# Patient Record
Sex: FEMALE | Race: WHITE | NOT HISPANIC OR LATINO | URBAN - METROPOLITAN AREA
[De-identification: names, ages, dates, MRNs, and addresses within clinical notes are randomized per-mention and may not be internally consistent; named-entity substitution may affect disease eponyms.]

---

## 2019-07-30 DIAGNOSIS — Z30.09 CONTRACEPTIVE EDUCATION: Primary | ICD-10-CM

## 2021-06-07 ENCOUNTER — APPOINTMENT (OUTPATIENT)
Dept: URGENT CARE | Facility: CLINIC | Age: 26
End: 2021-06-07

## 2021-06-07 DIAGNOSIS — Z02.1 PRE-EMPLOYMENT HEALTH SCREENING EXAMINATION: Primary | ICD-10-CM

## 2021-06-07 PROCEDURE — 86480 TB TEST CELL IMMUN MEASURE: CPT

## 2021-06-10 LAB
GAMMA INTERFERON BACKGROUND BLD IA-ACNC: 0.03 IU/ML
M TB IFN-G BLD-IMP: NEGATIVE
M TB IFN-G CD4+ BCKGRND COR BLD-ACNC: 0 IU/ML
M TB IFN-G CD4+ BCKGRND COR BLD-ACNC: 0 IU/ML
MITOGEN IGNF BCKGRD COR BLD-ACNC: 3.52 IU/ML

## 2022-04-15 ENCOUNTER — OFFICE VISIT (OUTPATIENT)
Dept: FAMILY MEDICINE CLINIC | Facility: HOSPITAL | Age: 27
End: 2022-04-15
Payer: COMMERCIAL

## 2022-04-15 VITALS
HEART RATE: 102 BPM | HEIGHT: 64 IN | DIASTOLIC BLOOD PRESSURE: 80 MMHG | BODY MASS INDEX: 22.74 KG/M2 | WEIGHT: 133.2 LBS | OXYGEN SATURATION: 98 % | TEMPERATURE: 98.8 F | SYSTOLIC BLOOD PRESSURE: 125 MMHG

## 2022-04-15 DIAGNOSIS — Z00.00 ANNUAL PHYSICAL EXAM: Primary | ICD-10-CM

## 2022-04-15 DIAGNOSIS — Z12.4 ENCOUNTER FOR SCREENING FOR CERVICAL CANCER: ICD-10-CM

## 2022-04-15 PROCEDURE — 99385 PREV VISIT NEW AGE 18-39: CPT | Performed by: NURSE PRACTITIONER

## 2022-04-15 PROCEDURE — 3008F BODY MASS INDEX DOCD: CPT | Performed by: NURSE PRACTITIONER

## 2022-04-15 PROCEDURE — 3725F SCREEN DEPRESSION PERFORMED: CPT | Performed by: NURSE PRACTITIONER

## 2022-04-15 PROCEDURE — 1036F TOBACCO NON-USER: CPT | Performed by: NURSE PRACTITIONER

## 2022-04-15 RX ORDER — NORETHINDRONE ACETATE AND ETHINYL ESTRADIOL 1MG-20(24)
KIT ORAL
COMMUNITY
Start: 2015-03-01 | End: 2022-07-12

## 2022-04-15 RX ORDER — NORETHINDRONE ACETATE AND ETHINYL ESTRADIOL 1MG-20(24)
1 KIT ORAL DAILY
COMMUNITY
End: 2022-07-12

## 2022-04-15 NOTE — PROGRESS NOTES
[address] [facility] [address]     NAME: Mayra Singh  AGE: 28 y o  SEX: female  : 1995     DATE: 4/15/2022     Assessment and Plan:     Problem List Items Addressed This Visit     None      Visit Diagnoses     Annual physical exam    -  Primary    PE updated, advise next in 1 year; consult entered to establish w/local gyn; will defer labs at this time    Encounter for screening for cervical cancer        Relevant Orders    Ambulatory Referral to Gynecology        Immunizations and preventive care screenings were discussed with patient today  Appropriate education was printed on patient's after visit summary  Counseling:  Alcohol/drug use: discussed moderation in alcohol intake, the recommendations for healthy alcohol use, and avoidance of illicit drug use  Dental Health: discussed importance of regular tooth brushing, flossing, and dental visits  Sexual health: discussed sexually transmitted diseases, partner selection, use of condoms, avoidance of unintended pregnancy, and contraceptive alternatives  · Exercise: the importance of regular exercise/physical activity was discussed  Recommend exercise 3-5 times per week for at least 30 minutes  Depression Screening and Follow-up Plan: Patient was screened for depression during today's encounter  They screened negative with a PHQ-2 score of 0  Return in about 1 year (around 4/15/2023) for Annual physical      Chief Complaint:     Chief Complaint   Patient presents with    New Patient Visit      History of Present Illness:     Adult Annual Physical   Patient here for a comprehensive physical exam  The patient reports no problems   PMH: none  PSH: wisdom teeth  Meds: OCP rx'd by gyn  Social: , no children, 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)      Diet and Physical Activity  · Diet/Nutrition: well balanced diet     · Exercise: strength training exercises and 3-4 times a week on average  Depression Screening  PHQ-2/9 Depression Screening    Little interest or pleasure in doing things: 0 - not at all  Feeling down, depressed, or hopeless: 0 - not at all  PHQ-2 Score: 0  PHQ-2 Interpretation: Negative depression screen       General Health  · Sleep: sleeps well  · Hearing: normal - bilateral   · Vision: goes for regular eye exams, most recent eye exam <1 year ago and wears contacts  · Dental: regular dental visits  /GYN Health  · Last menstrual period: 4/2022  · Contraceptive method: oral contraceptives  Review of Systems:     Review of Systems   Constitutional: Negative  HENT: Negative  Eyes: Negative  Respiratory: Negative  Cardiovascular: Negative  Gastrointestinal: Negative  Genitourinary: Negative  Musculoskeletal: Negative  Skin: Negative  Neurological: Negative         Past Medical History:     Past Medical History:   Diagnosis Date    Allergic       Past Surgical History:     Past Surgical History:   Procedure Laterality Date    WISDOM TOOTH EXTRACTION  2016      Social History:     Social History     Socioeconomic History    Marital status: Unknown     Spouse name: None    Number of children: None    Years of education: None    Highest education level: None   Occupational History    None   Tobacco Use    Smoking status: Never Smoker    Smokeless tobacco: Never Used   Vaping Use    Vaping Use: Never used   Substance and Sexual Activity    Alcohol use: Yes     Comment: social     Drug use: Never    Sexual activity: None   Other Topics Concern    None   Social History Narrative    None     Social Determinants of Health     Financial Resource Strain: Not on file   Food Insecurity: Not on file   Transportation Needs: Not on file   Physical Activity: Not on file   Stress: Not on file   Social Connections: Not on file   Intimate Partner Violence: Not on file   Housing Stability: Not on file      Family History:     Family History   Problem Relation Age of Onset    Hypertension Mother     Heart disease Father    Suzi Punt' disease Father     Arrhythmia Father         pacemaker placed      Current Medications:     Current Outpatient Medications   Medication Sig Dispense Refill    norethindrone-ethinyl estradiol-ferrous fumarate (Blisovi 24 Fe) 1-20 MG-MCG(24) per tablet Take 1 tablet by mouth daily      norethindrone-ethinyl estradiol-ferrous fumarate (Blisovi 24 Fe) 1-20 MG-MCG(24) per tablet        No current facility-administered medications for this visit  Allergies: Allergies   Allergen Reactions    Amoxicillin Hives    Keflex [Cephalexin] Hives      Physical Exam:     /80 (BP Location: Right arm, Patient Position: Sitting, Cuff Size: Large)   Pulse 102   Temp 98 8 °F (37 1 °C) (Tympanic)   Ht 5' 4" (1 626 m)   Wt 60 4 kg (133 lb 3 2 oz)   SpO2 98%   BMI 22 86 kg/m²     Physical Exam  Vitals reviewed  Constitutional:       General: She is not in acute distress  Appearance: Normal appearance  HENT:      Head: Normocephalic and atraumatic  Right Ear: Tympanic membrane normal       Left Ear: Tympanic membrane normal       Nose: Nose normal       Mouth/Throat:      Mouth: Mucous membranes are moist       Pharynx: Oropharynx is clear  Eyes:      General: No scleral icterus  Neck:      Thyroid: No thyromegaly  Cardiovascular:      Rate and Rhythm: Normal rate and regular rhythm  Heart sounds: No murmur heard  Pulmonary:      Effort: Pulmonary effort is normal  No respiratory distress  Breath sounds: Normal breath sounds  Abdominal:      General: Bowel sounds are normal       Palpations: Abdomen is soft  Tenderness: There is no abdominal tenderness  There is no guarding or rebound  Musculoskeletal:      Cervical back: Normal range of motion  Right lower leg: No edema  Left lower leg: No edema  Lymphadenopathy:      Cervical: No cervical adenopathy     Skin: General: Skin is warm and dry  Neurological:      General: No focal deficit present  Mental Status: She is alert and oriented to person, place, and time  Psychiatric:         Mood and Affect: Mood normal          Behavior: Behavior normal          Thought Content:  Thought content normal          Judgment: Judgment normal           Rometta Dance, CRNP   [address] [place]

## 2022-04-15 NOTE — PATIENT INSTRUCTIONS

## 2022-07-12 ENCOUNTER — OFFICE VISIT (OUTPATIENT)
Dept: OBGYN CLINIC | Facility: CLINIC | Age: 27
End: 2022-07-12
Payer: COMMERCIAL

## 2022-07-12 VITALS
WEIGHT: 138.2 LBS | BODY MASS INDEX: 23.6 KG/M2 | HEIGHT: 64 IN | DIASTOLIC BLOOD PRESSURE: 81 MMHG | SYSTOLIC BLOOD PRESSURE: 118 MMHG

## 2022-07-12 DIAGNOSIS — Z30.41 SURVEILLANCE FOR BIRTH CONTROL, ORAL CONTRACEPTIVES: ICD-10-CM

## 2022-07-12 DIAGNOSIS — Z30.09 CONTRACEPTIVE EDUCATION: ICD-10-CM

## 2022-07-12 DIAGNOSIS — Z31.69 ENCOUNTER FOR PRECONCEPTION CONSULTATION: ICD-10-CM

## 2022-07-12 DIAGNOSIS — Z12.4 ENCOUNTER FOR SCREENING FOR CERVICAL CANCER: ICD-10-CM

## 2022-07-12 DIAGNOSIS — Z01.419 ENCOUNTER FOR GYNECOLOGICAL EXAMINATION WITHOUT ABNORMAL FINDING: Primary | ICD-10-CM

## 2022-07-12 PROCEDURE — 99385 PREV VISIT NEW AGE 18-39: CPT | Performed by: OBSTETRICS & GYNECOLOGY

## 2022-07-12 PROCEDURE — 0503F POSTPARTUM CARE VISIT: CPT | Performed by: OBSTETRICS & GYNECOLOGY

## 2022-07-12 NOTE — PATIENT INSTRUCTIONS
Pap every 3 years if normal, STI testing as indicated, exercise most days of week, obtain appropriate diet and hydration, Calcium 1000mg + 600 vit D daily, birth control as directed (ACHES reviewed)  Benefits, risks and alternatives discussed/reviewed  Condom use when sexually active for sexually transmitted infection prevention  HPV 9 vaccine recommended through age 39  Check with your insurance for coverage  If covered, call office to schedule start of vaccine series  Annual mammogram starting at age 36, monthly breast self exam  Regina Heimlich   at red light or at least  20 times a day  If not  pregant in one year of actively trying with normal cycles  contact the office

## 2022-07-12 NOTE — PROGRESS NOTES
[address], [address], [facility] [address]    ASSESSMENT/PLAN: Naren Ortiz is a 28 y o  No obstetric history on file  who presents for annual gynecologic exam     Encounter for routine gynecologic examination  - Routine well woman exam completed today  - HPV Vaccination status: Immunization series complete   -covid  Vaccine  + maderna  X 3  dw p booster and flu shot in fall   - STI screening offered including HIV testing: culture done   - Contraceptive counseling discussed  Current contraception: none or condoms:     Additional problems addressed during this visit:  1  Encounter for gynecological examination without abnormal finding    2  Contraceptive education  Comments:  Stopped the pill now report cycles less than 21d  lasting greater than 10 d or no menses in 3 mo    3  Surveillance for birth control, oral contraceptives    4  Encounter for screening for cervical cancer  -     Ambulatory Referral to Gynecology  -     IGP,CtNg,AptimaHPV,rfx16/18,45    5  Encounter for preconception consultation  Comments:  no genetic disorders,  Dw pt ovulation and  timed  intercourse  Start PNV  no alcohol intake    Orders:  -     Rubella antibody, IgG; Future  -     Rubella antibody, IgG      CC:  Annual Gynecologic Examination    HPI: Naren Ortiz is a 28 y o  No obstetric history on file  who presents for annual gynecologic examination  27 yo here for wellness exam     Cycles  Monthly for  5 days  +  Gardasil  + [address]  X 3  Desires  pregnancy in the next year just stopped the pill    No genetic disorders in either family  No significant medical  Hx  The following portions of the patient's history were reviewed and updated as appropriate: She  has a past medical history of Allergic  She  has a past surgical history that includes [place] tooth extraction (2016)    Her family history includes Arrhythmia in her father; Duaine Dozier' disease in her father; Heart disease in her father; Hypertension in her mother  She  reports that she has never smoked  She has never used smokeless tobacco  She reports current alcohol use of about 1 0 standard drink of alcohol per week  She reports that she does not use drugs  No current outpatient medications on file  No current facility-administered medications for this visit  She is allergic to amoxicillin and keflex [cephalexin]       Review of Systems   Constitutional: Negative for chills and fever  HENT: Negative for ear pain and sore throat  Eyes: Negative for pain and visual disturbance  Respiratory: Negative for cough and shortness of breath  Cardiovascular: Negative for chest pain and palpitations  Gastrointestinal: Negative for abdominal pain and vomiting  Genitourinary: Negative for dysuria and hematuria  Musculoskeletal: Negative for arthralgias and back pain  Skin: Negative for color change and rash  Neurological: Negative for seizures and syncope  Hematological: Negative  Psychiatric/Behavioral: Negative  All other systems reviewed and are negative  Objective:  /81 (BP Location: Left arm, Patient Position: Sitting, Cuff Size: Standard)   Ht 5' 4" (1 626 m)   Wt 62 7 kg (138 lb 3 2 oz)   LMP 07/12/2022 (Exact Date)   BMI 23 72 kg/m²    Physical Exam  Vitals and nursing note reviewed  Constitutional:       Appearance: Normal appearance  HENT:      Head: Normocephalic  Cardiovascular:      Rate and Rhythm: Normal rate and regular rhythm  Pulses: Normal pulses  Heart sounds: Normal heart sounds  Pulmonary:      Effort: Pulmonary effort is normal       Breath sounds: Normal breath sounds  Chest:      Chest wall: No mass, lacerations, swelling, tenderness or edema  Breasts: Juan Miguel Score is 4   Breasts are symmetrical       Right: Normal  No swelling, bleeding, inverted nipple, mass, nipple discharge, skin change, tenderness, axillary adenopathy or supraclavicular adenopathy  Left: No swelling, bleeding, inverted nipple, mass, nipple discharge, skin change, tenderness, axillary adenopathy or supraclavicular adenopathy  Abdominal:      General: Abdomen is flat  Bowel sounds are normal       Palpations: Abdomen is soft  Genitourinary:     General: Normal vulva  Exam position: Lithotomy position  Pubic Area: No rash  Juan Miguel stage (genital): 4       Labia:         Right: No rash, tenderness or lesion  Left: No rash, tenderness or lesion  Urethra: No urethral pain, urethral swelling or urethral lesion  Vagina: Normal       Cervix: No cervical motion tenderness or discharge  Uterus: Normal        Adnexa: Right adnexa normal and left adnexa normal       Rectum: Normal    Musculoskeletal:         General: Normal range of motion  Cervical back: Neck supple  Lymphadenopathy:      Upper Body:      Right upper body: No supraclavicular, axillary or pectoral adenopathy  Left upper body: No supraclavicular, axillary or pectoral adenopathy  Lower Body: No right inguinal adenopathy  No left inguinal adenopathy  Skin:     General: Skin is warm and dry  Neurological:      General: No focal deficit present  Mental Status: She is alert and oriented to person, place, and time  Psychiatric:         Mood and Affect: Mood normal          Behavior: Behavior normal          Thought Content:  Thought content normal          Judgment: Judgment normal

## 2022-07-15 LAB
C TRACH RRNA CVX QL NAA+PROBE: NEGATIVE
CYTOLOGIST CVX/VAG CYTO: NORMAL
DX ICD CODE: NORMAL
HPV I/H RISK 4 DNA CVX QL PROBE+SIG AMP: NEGATIVE
N GONORRHOEA RRNA CVX QL NAA+PROBE: NEGATIVE
OTHER STN SPEC: NORMAL
PATH REPORT.FINAL DX SPEC: NORMAL
SL AMB NOTE:: NORMAL
SL AMB SPECIMEN ADEQUACY: NORMAL
SL AMB TEST METHODOLOGY: NORMAL

## 2024-01-16 ENCOUNTER — TELEMEDICINE (OUTPATIENT)
Dept: OBGYN CLINIC | Facility: CLINIC | Age: 29
End: 2024-01-16
Payer: COMMERCIAL

## 2024-01-16 ENCOUNTER — INITIAL PRENATAL (OUTPATIENT)
Dept: OBGYN CLINIC | Facility: CLINIC | Age: 29
End: 2024-01-16
Payer: COMMERCIAL

## 2024-01-16 VITALS — HEIGHT: 64 IN | BODY MASS INDEX: 24.75 KG/M2 | WEIGHT: 145 LBS

## 2024-01-16 VITALS
HEIGHT: 64 IN | WEIGHT: 145 LBS | BODY MASS INDEX: 24.75 KG/M2 | SYSTOLIC BLOOD PRESSURE: 108 MMHG | DIASTOLIC BLOOD PRESSURE: 60 MMHG

## 2024-01-16 DIAGNOSIS — Z36.87 UNSURE OF LMP (LAST MENSTRUAL PERIOD) AS REASON FOR ULTRASOUND SCAN: ICD-10-CM

## 2024-01-16 DIAGNOSIS — Z11.3 SCREEN FOR SEXUALLY TRANSMITTED DISEASES: ICD-10-CM

## 2024-01-16 DIAGNOSIS — Z36.89 ENCOUNTER FOR OTHER SPECIFIED ANTENATAL SCREENING: Primary | ICD-10-CM

## 2024-01-16 DIAGNOSIS — Z36.89 ENCOUNTER FOR OTHER SPECIFIED ANTENATAL SCREENING: ICD-10-CM

## 2024-01-16 DIAGNOSIS — Z34.01 ENCOUNTER FOR SUPERVISION OF NORMAL FIRST PREGNANCY IN FIRST TRIMESTER: Primary | ICD-10-CM

## 2024-01-16 DIAGNOSIS — Z3A.09 9 WEEKS GESTATION OF PREGNANCY: ICD-10-CM

## 2024-01-16 PROBLEM — Z30.09 CONTRACEPTIVE EDUCATION: Status: RESOLVED | Noted: 2022-07-12 | Resolved: 2024-01-16

## 2024-01-16 PROBLEM — Z30.41 SURVEILLANCE FOR BIRTH CONTROL, ORAL CONTRACEPTIVES: Status: RESOLVED | Noted: 2022-07-12 | Resolved: 2024-01-16

## 2024-01-16 PROBLEM — Z31.69 ENCOUNTER FOR PRECONCEPTION CONSULTATION: Status: RESOLVED | Noted: 2022-07-12 | Resolved: 2024-01-16

## 2024-01-16 LAB
SL AMB  POCT GLUCOSE, UA: NORMAL
SL AMB POCT URINE PROTEIN: NORMAL

## 2024-01-16 PROCEDURE — PNV: Performed by: OBSTETRICS & GYNECOLOGY

## 2024-01-16 PROCEDURE — 76817 TRANSVAGINAL US OBSTETRIC: CPT | Performed by: OBSTETRICS & GYNECOLOGY

## 2024-01-16 PROCEDURE — OBC

## 2024-01-16 PROCEDURE — 81002 URINALYSIS NONAUTO W/O SCOPE: CPT | Performed by: OBSTETRICS & GYNECOLOGY

## 2024-01-16 NOTE — PROGRESS NOTES
"Routine Prenatal Visit  [facility] - [address], [address], [place] [address]    Assessment/Plan:  Gay is a 28 y.o. year old  at 9w1d who presents for routine prenatal visit.     1. Encounter for supervision of normal first pregnancy in first trimester    2. 9 weeks gestation of pregnancy  Assessment & Plan:  TV U/S today consistent with LMP for dating. Pt is healthy with no medical problems    Orders:  -     POCT urine dip    3. Encounter for other specified  screening  -     Ambulatory referral to social work care management program; Future  -     Ambulatory Referral to Maternal Fetal Medicine; Future; Expected date: 2024  -     Pregnancy, Initial Screen; Future  -     Pregnancy, Initial Screen    4. Unsure of LMP (last menstrual period) as reason for ultrasound scan  -     AMB US OB < 14 weeks single or first gestation level 1          Subjective:     CC: Prenatal care    Gay Alan is a 28 y.o.  female who presents for routine prenatal care at 9w1d.  Pregnancy ROS: no leakage of fluid, pelvic pain, or vaginal bleeding.  no fetal movement.    The following portions of the patient's history were reviewed and updated as appropriate: allergies, current medications, past family history, past medical history, obstetric history, gynecologic history, past social history, past surgical history and problem list.      Objective:  /60 (BP Location: Right arm, Patient Position: Sitting, Cuff Size: Standard)   Ht 5' 4\" (1.626 m)   Wt 65.8 kg (145 lb)   LMP 2023 (Exact Date)   BMI 24.89 kg/m²   Pregravid Weight/BMI: 65.8 kg (145 lb) (BMI 24.88)  Current Weight: 65.8 kg (145 lb)   Total Weight Gain: 0 kg (0 lb)   Pre-Janie Vitals      Flowsheet Row Most Recent Value   Prenatal Assessment    Prenatal Vitals    Blood Pressure 108/60   Weight - Scale 65.8 kg (145 lb)   Urine Albumin/Glucose    Dilation/Effacement/Station    Vaginal Drainage  "   Edema              General: Well appearing, no distress  Respiratory: Unlabored breathing  Cardiovascular: Regular rate.  Abdomen: Soft, gravid, nontender  Fundal Height: Appropriate for gestational age.  Extremities: Warm and well perfused.  Non tender.

## 2024-01-16 NOTE — PROGRESS NOTES
The patient was identified by name and date of birth. Gay was informed that this is a telemedicine nurse OB intake visit and that the visit is being conducted through [employer] and patient was informed that this is a secure, HIPAA-compliant platform. She agrees to proceed.  My office door was closed. No one else was in the room. She acknowledged consent and understanding of privacy and security of the video platform. The patient has agreed to participate and understands they can discontinue the visit at any time.    Current pregnancy symptoms: fatigue, nausea-advised 5-6 smaller meals, avoiding an empty belly  Constipation YES-advised increased fluids, fiber, may add Colace BID  Headaches no  Cramping/spotting no  PICA cravings no    Diabetes-  There is no height or weight on file to calculate BMI.  If patient has 1 or more, please order early 1 hour GTT  History of GDM no  BMI >30 no  History of PCOS or current metformin use no  History of LGA/macrosomic infant (4000g/9lbs) no    If patient has 2 or more, please order early 1 hour GTT  AMA no  First degree relative with type 2 diabetes no  History of chronic HTN, hyperlipidemia, elevated A1C no  High risk race (, , ,  or ) no    Hypertension- if you answer yes, please order preeclampsia labs (cbc, comprehensive metabolic panel, urine protein creatinine ratio, uric acid)  History of of chronic HTN no  History of gestational HTN no  History of preeclampsia, eclampsia, or HELLP syndrome no  History of diabetes no  History of lupus, autoimmune disease, kidney disease, antiphospholipid syndrome, rheumatoid arthritis, sjogren's syndrome no    Thyroid- if yes order TSH with reflex T4 (Unless TSH value on file within last 4 weeks then do not need to order)  History of thyroid disease no    Bleeding Disorder or Hx of DVT-patient or first degree relative with history of. Order the following if not done  previously.   (Factor V, antithrombin III, prothrombin gene mutation, protein C and S Ag, lupus anticoagulant, anticardiolipin, beta-2 glycoprotein)   no    OB/GYN-  History of abnormal pap smear no  History of HPV no  History of Herpes/HSV no  History of other STI (gonorrhea, chlamydia, trich) (or current partner with Hep B, Hep C, or HIV) no  History of prior  no  History of prior  no  History of  delivery prior to 36 weeks 6 days no  History of blood transfusion no  Ok for blood transfusion YES    Substance screening-   History of tobacco use no  Currently using tobacco no  Currently using alcohol no  Presently using drugs no  Past drug use (if yes, order urine drug screening panel)  no  IV drug use (if yes, order urine drug screening panel) no  Partner drug use (if yes, order urine drug screening panel) no  Parent/Family drug use (do not order urine drug screening panel just for family hx) no    Depression Screening-  PHQ-9 Score: 0    MRSA Screening-   Does the pt have a hx of MRSA? no  If yes- please follow MRSA protocol and obtain a nasal swab for MRSA culture at 12 week visit.    Immunizations:  Influenza vaccine given this season YES-had in 2023  Discussed Tdap vaccine-yes  Discussed COVID Vaccine- had x4 last booster     Genetic/M-  Do you or your partner have a history of any of the following in yourselves or first degree relatives?  Cystic fibrosis no  Spinal muscular atrophy no  Hemoglobinopathy/Sickle Cell/Thalassemia no  Fragile X Intellectual Disability no    If yes, discuss carrier screening and recommend consultation with [facility]/genetic counseling.  If no, discuss option for carrier screening and/or genetic testing with Nuchal Ultrasound. Patient interested YES  Appointment at [facility] made NO-to be provided at appointment-pended    Interview education  [facility] Pregnancy Essentials Book reviewed and discussed YES      Prenatal lab work scripts NO-to be provided  at appointment-pended    Extra labs ordered:declines CF/SMA      Details that I feel the provider should be aware of: , planned pregnancy,  Tanvir. Hx anxiety, no meds. Wants to discuss safety of meds for seasonal allergies. Last PAP 22. Needs G/C. Intake was done virtually. All prenatal labs and referrals are pended. Please provide patient with scripts and blue folder at appointment on 24.    The patient was oriented to our practice, reviewed delivering physicians and [facility] in [place] for Delivery. All questions were answered.    Interviewed by: Fatimah Ramirez RN

## 2024-01-16 NOTE — PATIENT INSTRUCTIONS
Congratulations!! Please review our Pregnancy Essential Guide for informative education and here is a link to take a irtual tour of our [facility].     [facility]  [facility] ([email or web address])       [facility] Tour on [facility]

## 2024-01-17 ENCOUNTER — TELEPHONE (OUTPATIENT)
Dept: PSYCHIATRY | Facility: CLINIC | Age: 29
End: 2024-01-17

## 2024-01-17 ENCOUNTER — PATIENT OUTREACH (OUTPATIENT)
Dept: OBGYN CLINIC | Facility: CLINIC | Age: 29
End: 2024-01-17

## 2024-01-17 LAB
C TRACH RRNA SPEC QL NAA+PROBE: NOT DETECTED
EXTERNAL ANTIBODY SCREEN: NORMAL
EXTERNAL CHLAMYDIA SCREEN: NEGATIVE
EXTERNAL GONORRHEA SCREEN: NEGATIVE
EXTERNAL HEMOGLOBIN: 14.1 G/DL
EXTERNAL HEPATITIS B SURFACE ANTIGEN: NEGATIVE
EXTERNAL HIV-1/2 AB-AG: NORMAL
EXTERNAL PLATELET COUNT: 266 K/ÂΜL
EXTERNAL RH FACTOR: NEGATIVE
EXTERNAL RUBELLA IGG QUANTITATION: NORMAL
EXTERNAL SYPHILIS TOTAL IGG/IGM SCREENING: NORMAL
N GONORRHOEA RRNA SPEC QL NAA+PROBE: NOT DETECTED

## 2024-01-17 NOTE — PROGRESS NOTES
SW referred for initial prenatal assessment. Patient is , 9w2dGA with an ALFONSO of 24. SW called patient, who was agreeable to speaking with SW today.    Patient reports this is a planned pregnancy. She and FOB ( Willian) recently moved to [place] with their dog. Patient and FOB both drive. 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 Patient denies current financial concerns or need for MA/WIC/SNAP information, parenting education, or baby supply resources.     Patient denies current or h/o substance use, DV/IPV, CYS involvement, and legal concerns. Patient indicates good support from FOB, family, and friends. She enjoys reading exercising, taking her dogs for a walk, listening to music, and spending time with friends for stress relief.     Patient indicated h/o anxiety, although never formally diagnosed or treated. Expressed interest in starting with virtual therapy - SW discussed [facility] through [facility]. Patient interested in this program. TONY called [facility], spoke with gabino Livingston, who put note in patient's chart chart and sent a note to scheduling department for [facility]. SW to follow up with patient next week to ensure she was scheduled with therapy.

## 2024-01-17 NOTE — TELEPHONE ENCOUNTER
Caller requested patient be contacted for [facility] appointment. Writer added patient to wait list for [facility].

## 2024-01-18 ENCOUNTER — TELEPHONE (OUTPATIENT)
Dept: PSYCHIATRY | Facility: CLINIC | Age: 29
End: 2024-01-18

## 2024-01-18 ENCOUNTER — TELEPHONE (OUTPATIENT)
Facility: HOSPITAL | Age: 29
End: 2024-01-18

## 2024-01-18 NOTE — TELEPHONE ENCOUNTER
Called patient to schedule MFM appointment, based on referral issued to Maternal Fetal Medicine by OB office.    Left voicemail requesting patient to call back and schedule appointment, with office number for return call [phone].

## 2024-01-19 LAB
ABO GROUP BLD: ABNORMAL
APPEARANCE UR: CLEAR
BACTERIA UR CULT: ABNORMAL
BACTERIA UR CULT: NO GROWTH
BACTERIA URNS QL MICRO: NORMAL
BASOPHILS # BLD AUTO: 0.1 X10E3/UL (ref 0–0.2)
BASOPHILS NFR BLD AUTO: 1 %
BILIRUB UR QL STRIP: NEGATIVE
BLD GP AB SCN SERPL QL: NEGATIVE
C TRACH RRNA SPEC QL NAA+PROBE: NEGATIVE
CASTS URNS QL MICRO: NORMAL /LPF
COLOR UR: YELLOW
EOSINOPHIL # BLD AUTO: 0 X10E3/UL (ref 0–0.4)
EOSINOPHIL NFR BLD AUTO: 1 %
EPI CELLS #/AREA URNS HPF: NORMAL /HPF (ref 0–10)
ERYTHROCYTE [DISTWIDTH] IN BLOOD BY AUTOMATED COUNT: 12.4 % (ref 11.7–15.4)
GLUCOSE UR QL: NEGATIVE
HBV SURFACE AG SERPL QL IA: NEGATIVE
HCT VFR BLD AUTO: 41.5 % (ref 34–46.6)
HCV AB S/CO SERPL IA: NON REACTIVE
HGB BLD-MCNC: 14.1 G/DL (ref 11.1–15.9)
HGB UR QL STRIP: NEGATIVE
HIV 1+2 AB+HIV1 P24 AG SERPL QL IA: NON REACTIVE
IMM GRANULOCYTES # BLD: 0 X10E3/UL (ref 0–0.1)
IMM GRANULOCYTES NFR BLD: 0 %
KETONES UR QL STRIP: NEGATIVE
LEUKOCYTE ESTERASE UR QL STRIP: ABNORMAL
LYMPHOCYTES # BLD AUTO: 1.9 X10E3/UL (ref 0.7–3.1)
LYMPHOCYTES NFR BLD AUTO: 21 %
MCH RBC QN AUTO: 31.8 PG (ref 26.6–33)
MCHC RBC AUTO-ENTMCNC: 34 G/DL (ref 31.5–35.7)
MCV RBC AUTO: 94 FL (ref 79–97)
MICRO URNS: ABNORMAL
MONOCYTES # BLD AUTO: 0.5 X10E3/UL (ref 0.1–0.9)
MONOCYTES NFR BLD AUTO: 6 %
N GONORRHOEA RRNA SPEC QL NAA+PROBE: NEGATIVE
NEUTROPHILS # BLD AUTO: 6.4 X10E3/UL (ref 1.4–7)
NEUTROPHILS NFR BLD AUTO: 71 %
NITRITE UR QL STRIP: NEGATIVE
PH UR STRIP: 6.5 [PH] (ref 5–7.5)
PLATELET # BLD AUTO: 266 X10E3/UL (ref 150–450)
PROT UR QL STRIP: NEGATIVE
RBC # BLD AUTO: 4.44 X10E6/UL (ref 3.77–5.28)
RBC #/AREA URNS HPF: NORMAL /HPF (ref 0–2)
RH BLD: NEGATIVE
RPR SER QL: NON REACTIVE
RUBV IGG SERPL IA-ACNC: 2.31 INDEX
SL AMB INTERPRETATION: NORMAL
SP GR UR: 1.01 (ref 1–1.03)
UROBILINOGEN UR STRIP-ACNC: 0.2 MG/DL (ref 0.2–1)
WBC # BLD AUTO: 8.9 X10E3/UL (ref 3.4–10.8)
WBC #/AREA URNS HPF: NORMAL /HPF (ref 0–5)

## 2024-01-22 ENCOUNTER — PATIENT OUTREACH (OUTPATIENT)
Dept: OBGYN CLINIC | Facility: CLINIC | Age: 29
End: 2024-01-22

## 2024-01-22 NOTE — PROGRESS NOTES
SW called patient today to inquire if she had been connected with therapy services. Patient stated she received their call while she was at work so she could not answer, but planned to call them back today to schedule. SW to follow up with patient next week to ensure she was connected with services.

## 2024-01-26 ENCOUNTER — APPOINTMENT (OUTPATIENT)
Dept: LAB | Facility: CLINIC | Age: 29
End: 2024-01-26
Payer: COMMERCIAL

## 2024-01-26 DIAGNOSIS — Z20.1 EXPOSURE TO TB: Primary | ICD-10-CM

## 2024-01-26 DIAGNOSIS — Z20.1 EXPOSURE TO TB: ICD-10-CM

## 2024-01-26 PROCEDURE — 36415 COLL VENOUS BLD VENIPUNCTURE: CPT

## 2024-01-26 PROCEDURE — 86480 TB TEST CELL IMMUN MEASURE: CPT

## 2024-01-27 LAB
GAMMA INTERFERON BACKGROUND BLD IA-ACNC: <0 IU/ML
M TB IFN-G BLD-IMP: NEGATIVE
M TB IFN-G CD4+ BCKGRND COR BLD-ACNC: 0 IU/ML
M TB IFN-G CD4+ BCKGRND COR BLD-ACNC: 0 IU/ML
MITOGEN IGNF BCKGRD COR BLD-ACNC: 10 IU/ML

## 2024-01-28 PROBLEM — Z67.91 RH NEGATIVE STATUS IN SINGLETON PREGNANCY IN FIRST TRIMESTER: Status: ACTIVE | Noted: 2024-01-28

## 2024-01-28 PROBLEM — O26.891 RH NEGATIVE STATUS IN SINGLETON PREGNANCY IN FIRST TRIMESTER: Status: ACTIVE | Noted: 2024-01-28

## 2024-02-01 ENCOUNTER — PATIENT OUTREACH (OUTPATIENT)
Dept: OBGYN CLINIC | Facility: CLINIC | Age: 29
End: 2024-02-01

## 2024-02-01 NOTE — PROGRESS NOTES
Second attempt to call patient regarding therapy services with [facility] - no answer. SW left  requesting a return call. Per note from Marsha MAYBERRY on 1/29, patient had been contacted twice for therapy services and was removed from wait list due to lack of response. Patient's next appointment with [facility] is scheduled for 2/13/24 at [facility].

## 2024-02-08 ENCOUNTER — PATIENT OUTREACH (OUTPATIENT)
Dept: OBGYN CLINIC | Facility: CLINIC | Age: 29
End: 2024-02-08

## 2024-02-08 NOTE — PROGRESS NOTES
Third attempt to reach patient regarding therapy at [facility] - no answer. TONY left VM requesting a call back..  Due to lack of response from patient, TONY sent UTR letter via [facility]. SW included phone number for [facility] so patient can be added to their wait list again if interested.

## 2024-02-10 PROBLEM — Z3A.13 13 WEEKS GESTATION OF PREGNANCY: Status: ACTIVE | Noted: 2024-01-16

## 2024-02-12 ENCOUNTER — ROUTINE PRENATAL (OUTPATIENT)
Dept: PERINATAL CARE | Facility: OTHER | Age: 29
End: 2024-02-12
Payer: COMMERCIAL

## 2024-02-12 VITALS
BODY MASS INDEX: 25.47 KG/M2 | DIASTOLIC BLOOD PRESSURE: 58 MMHG | WEIGHT: 149.2 LBS | SYSTOLIC BLOOD PRESSURE: 112 MMHG | HEIGHT: 64 IN | HEART RATE: 101 BPM

## 2024-02-12 DIAGNOSIS — O28.3 NUCHAL FOLD THICKENING ON PRENATAL ULTRASOUND: Primary | ICD-10-CM

## 2024-02-12 DIAGNOSIS — Z36.89 ENCOUNTER FOR OTHER SPECIFIED ANTENATAL SCREENING: ICD-10-CM

## 2024-02-12 DIAGNOSIS — Z3A.13 13 WEEKS GESTATION OF PREGNANCY: ICD-10-CM

## 2024-02-12 PROCEDURE — 76801 OB US < 14 WKS SINGLE FETUS: CPT | Performed by: OBSTETRICS & GYNECOLOGY

## 2024-02-12 PROCEDURE — 76813 OB US NUCHAL MEAS 1 GEST: CPT | Performed by: OBSTETRICS & GYNECOLOGY

## 2024-02-12 PROCEDURE — 99243 OFF/OP CNSLTJ NEW/EST LOW 30: CPT | Performed by: OBSTETRICS & GYNECOLOGY

## 2024-02-12 NOTE — PROGRESS NOTES
OFFICE CONSULT  Referring physician:   Cedrick Rasmussen Md  [address]  [address]  [place] [address]      Dear Dr. Rasmussen      Thank you for requesting a  consultation on your patient Ms. Gay Alan for the following indications:  Genetic screening    History  Gay currently is on prenatal vitamins, 162 mg of ASA and has an allergy to amoxicillin and Keflex which causes hives.  History significant for anxiety.  Surgical history includes wisdom tooth extraction.  Mother has hypertension  Her father has Graves' disease. She reports a family hx of Ashkenazi Gnosticist ancestry and would be interested in a discussion with genetic counseling.     Ultrasound findings:  The ultrasound shows a fetus concordant with dates. The nasal bone appears normal. The nuchal translucency appears thickened up to 3.2 mm. No other malformations are seen on today's early ultrasound.     The patient was informed of the findings and counseled about the limitations of the exam in detecting all forms of fetal congenital abnormalities.    She does not report any vaginal bleeding or uterine cramping or contractions.      Specific counseling was provided on the following problems:  Increased nuchal translucency- Nuchal thickening is associated with aneuploidy. The risk of aneuploidy is based on how thick this nuchal area is. One study showed the there is a:7 % risk of aneuploidy found in babies whose nuchal thickening is higher then the 95th percentile for crown-rump length up to 3.4 mm   Approximately 50 percent of fetuses with increased nuchal translucency and an abnormal karyotype found had trisomy 21; the rest had other chromosomal abnormalities. Most fetuses with trisomy 21 had a nuchal thickness less than 4.5 mm. By comparison, most fetuses with trisomy 13, trisomy 18, Noonans syndrome or Cruz syndrome had nuchal thickness =4.5 mm.  An adverse pregnancy outcome was recorded in only 4% of cases in which there were  normal findings at the 20-week scan and there was no evidence for aneuploidy. Seven of these cases were classified as 'potentially amenable' to ultrasound detection. With exclusion of these cases, the chance of a healthy baby, if the 20-week scan was completely normal, was 98%.   Some pregnancies with a thick nuchal fold have a higher risk for a heart defect but the significance started at a NT of 3.5 mm or more and her measurement is less than this.  We discussed the options for genetic screening which include invasive testing on the fetal placenta or on fetal skin cells within the amniotic fluid and compared this to noninvasive testing which includes cell free DNA screening.  We reviewed the risks, the benefits and the limitations of each.  Specifically amnio and cvs rule out other chromosome issues that NIPT will not detect like Noonans. In the end patient chose to complete the cell free DNA screen.          Future tests recommended:  The results of her NIPT will return in 7-10 days.  Screening for spina bifida with an MSAFP screen is a future test that can be prescribed through her OB office.  This blood work should be drawn preferably at 16 to 18 weeks so that the results return prior to her next scan.  The test though can be run until 21 weeks and 6 days if needed.  I have referred her and Tanvir to genetic counseling due to her hx of a thickened nuchal fold and her Ashkenazi Hinduism family history.    Future ultrasounds ordered today:   Fetal Level II ultrasound imaging is recommended at 16 weeks for an early anatomy scan and a 19-20 weeks' gestation for a level II scan.     Pre visit time reviewing her records   5 minutes  Face to face time 15 minutes  Post visit time on documentation of note, updating her problem list, adding orders and prescriptions 20 minutes.  Procedures that were completed today were charged separately.   The level of decision making was low level complexity.    Carlie Dave MD

## 2024-02-12 NOTE — LETTER
2024     Cedrick Rasmussen MD  [address]  [address]  [facility] [address]    Patient: Gay Alan   YOB: 1995   Date of Visit: 2024       Dear Dr. Rasmussen:    Thank you for referring Gay Alan to me for evaluation. Below are my notes for this consultation.    If you have questions, please do not hesitate to call me. I look forward to following your patient along with you.         Sincerely,        Carlie Dave MD        CC: No Recipients    Carlie Dave MD  2024 10:11 PM  Sign when Signing Visit  OFFICE CONSULT  Referring physician:   Cedrick Rasmussen Md  [address]  [address]  [place] [address]      Dear Dr. Rasmussen      Thank you for requesting a  consultation on your patient Ms. Gay Alan for the following indications:  Genetic screening    History  Gay currently is on prenatal vitamins, 162 mg of ASA and has an allergy to amoxicillin and Keflex which causes hives.  History significant for anxiety.  Surgical history includes wisdom tooth extraction.  Mother has hypertension  Her father has Graves' disease. She reports a family hx of Ashkenazi Baptism ancestry and would be interested in a discussion with genetic counseling.     Ultrasound findings:  The ultrasound shows a fetus concordant with dates. The nasal bone appears normal. The nuchal translucency appears thickened up to 3.2 mm. No other malformations are seen on today's early ultrasound.     The patient was informed of the findings and counseled about the limitations of the exam in detecting all forms of fetal congenital abnormalities.    She does not report any vaginal bleeding or uterine cramping or contractions.      Specific counseling was provided on the following problems:  Increased nuchal translucency- Nuchal thickening is associated with aneuploidy. The risk of aneuploidy is based on how thick this nuchal area is. One study showed the there is a:7 % risk  of aneuploidy found in babies whose nuchal thickening is higher then the 95th percentile for crown-rump length up to 3.4 mm   Approximately 50 percent of fetuses with increased nuchal translucency and an abnormal karyotype found had trisomy 21; the rest had other chromosomal abnormalities. Most fetuses with trisomy 21 had a nuchal thickness less than 4.5 mm. By comparison, most fetuses with trisomy 13, trisomy 18, Noonans syndrome or Cruz syndrome had nuchal thickness =4.5 mm.  An adverse pregnancy outcome was recorded in only 4% of cases in which there were normal findings at the 20-week scan and there was no evidence for aneuploidy. Seven of these cases were classified as 'potentially amenable' to ultrasound detection. With exclusion of these cases, the chance of a healthy baby, if the 20-week scan was completely normal, was 98%.   Some pregnancies with a thick nuchal fold have a higher risk for a heart defect but the significance started at a NT of 3.5 mm or more and her measurement is less than this.  We discussed the options for genetic screening which include invasive testing on the fetal placenta or on fetal skin cells within the amniotic fluid and compared this to noninvasive testing which includes cell free DNA screening.  We reviewed the risks, the benefits and the limitations of each.  Specifically amnio and cvs rule out other chromosome issues that NIPT will not detect like Noonans. In the end patient chose to complete the cell free DNA screen.          Future tests recommended:  The results of her NIPT will return in 7-10 days.  Screening for spina bifida with an MSAFP screen is a future test that can be prescribed through her OB office.  This blood work should be drawn preferably at 16 to 18 weeks so that the results return prior to her next scan.  The test though can be run until 21 weeks and 6 days if needed.  I have referred her and Tanvir to genetic counseling due to her hx of a thickened nuchal  fold and her Ashkenazi Gnosticism family history.    Future ultrasounds ordered today:   Fetal Level II ultrasound imaging is recommended at 16 weeks for an early anatomy scan and a 19-20 weeks' gestation for a level II scan.     Pre visit time reviewing her records   5 minutes  Face to face time 15 minutes  Post visit time on documentation of note, updating her problem list, adding orders and prescriptions 20 minutes.  Procedures that were completed today were charged separately.   The level of decision making was low level complexity.    Carlie Dave MD

## 2024-02-13 PROBLEM — O28.3 NUCHAL FOLD THICKENING ON PRENATAL ULTRASOUND: Status: ACTIVE | Noted: 2024-02-13

## 2024-02-15 ENCOUNTER — ROUTINE PRENATAL (OUTPATIENT)
Dept: OBGYN CLINIC | Facility: CLINIC | Age: 29
End: 2024-02-15
Payer: COMMERCIAL

## 2024-02-15 VITALS
WEIGHT: 147 LBS | HEIGHT: 64 IN | SYSTOLIC BLOOD PRESSURE: 112 MMHG | BODY MASS INDEX: 25.1 KG/M2 | DIASTOLIC BLOOD PRESSURE: 62 MMHG

## 2024-02-15 DIAGNOSIS — Z3A.13 13 WEEKS GESTATION OF PREGNANCY: Primary | ICD-10-CM

## 2024-02-15 DIAGNOSIS — Z34.01 ENCOUNTER FOR SUPERVISION OF NORMAL FIRST PREGNANCY IN FIRST TRIMESTER: ICD-10-CM

## 2024-02-15 DIAGNOSIS — Z67.91 RH NEGATIVE STATUS IN SINGLETON PREGNANCY IN FIRST TRIMESTER: ICD-10-CM

## 2024-02-15 DIAGNOSIS — O28.3 NUCHAL FOLD THICKENING ON PRENATAL ULTRASOUND: ICD-10-CM

## 2024-02-15 DIAGNOSIS — O26.891 RH NEGATIVE STATUS IN SINGLETON PREGNANCY IN FIRST TRIMESTER: ICD-10-CM

## 2024-02-15 DIAGNOSIS — Z36.1 NEED FOR MATERNAL SERUM ALPHA-PROTEIN (MSAFP) SCREENING: ICD-10-CM

## 2024-02-15 PROBLEM — Z13.79 ASHKENAZI JEWISH ANCESTRY REQUIRING POPULATION-SPECIFIC GENETIC SCREENING: Status: ACTIVE | Noted: 2024-02-15

## 2024-02-15 LAB
SL AMB  POCT GLUCOSE, UA: NORMAL
SL AMB POCT URINE PROTEIN: NORMAL

## 2024-02-15 PROCEDURE — 81002 URINALYSIS NONAUTO W/O SCOPE: CPT | Performed by: NURSE PRACTITIONER

## 2024-02-15 PROCEDURE — PNV: Performed by: NURSE PRACTITIONER

## 2024-02-15 NOTE — PROGRESS NOTES
"Routine Prenatal Visit  [facility] - [address], [address], [place] [address]    Assessment/Plan:  Gay is a 29 y.o. year old  at 13w3d who presents for routine prenatal visit.   Discussed thickened nuchal fold, will see genetics after NIPT resulted, early L2US scheduled for 16 weeks, questions answered. Call office with any concerns, questions, reviewed warning signs.  1. 13 weeks gestation of pregnancy  -     POCT urine dip    2. Encounter for supervision of normal first pregnancy in first trimester    3. Rh negative status in poe pregnancy in first trimester    4. Need for maternal serum alpha-protein (MSAFP) screening  -     Alpha fetoprotein, maternal; Future  -     Alpha fetoprotein, maternal    5. Nuchal fold thickening on prenatal ultrasound  Assessment & Plan:  Genetic counseling, NIPT pending, L2US@16 weeks, 20 weeks          Next OB Visit 4 weeks.    Subjective:     CC: Prenatal care    Gay Alan is a 29 y.o.  female who presents for routine prenatal care at 13w3d.  Pregnancy ROS: no leakage of fluid, pelvic pain, or vaginal bleeding.  normal fetal movement.  Pt feels well and has no complaints. Concerned with recent finding of thickened NT, had NIPT drawn on Monday.     The following portions of the patient's history were reviewed and updated as appropriate: allergies, current medications, past family history, past medical history, obstetric history, gynecologic history, past social history, past surgical history and problem list.      Objective:  /62 (BP Location: Left arm, Patient Position: Sitting, Cuff Size: Standard)   Ht 5' 4\" (1.626 m)   Wt 66.7 kg (147 lb)   LMP 2023 (Exact Date)   BMI 25.23 kg/m²   Pregravid Weight/BMI: 65.8 kg (145 lb) (BMI 24.88)  Current Weight: 66.7 kg (147 lb)   Total Weight Gain: 0.907 kg (2 lb)   Pre- Vitals      Flowsheet Row Most Recent Value   Prenatal Assessment    Fetal Heart Rate 158 "   Movement Absent   Prenatal Vitals    Blood Pressure 112/62   Weight - Scale 66.7 kg (147 lb)   Urine Albumin/Glucose    Dilation/Effacement/Station    Vaginal Drainage    Draining Fluid No   Edema    LLE Edema None   RLE Edema None   Facial Edema None             General: Well appearing, no distress  Abdomen: Soft, gravid, nontender  Extremities: Non tender.

## 2024-02-15 NOTE — PATIENT INSTRUCTIONS
Discussed thickened nuchal fold, will see genetics after NIPT resulted, early L2US scheduled for 16 weeks, questions answered  Call office with any concerns, questions, reviewed warning signs.

## 2024-02-17 LAB
CFDNA.FET/CFDNA.TOTAL SFR FETUS: NORMAL %
CFDNA.FET/CFDNA.TOTAL SFR FETUS: NORMAL %
CITATION REF LAB TEST: NORMAL
FET 13+18+21+X+Y ANEUP PLAS.CFDNA: NEGATIVE
FET CHR 21 TS PLAS.CFDNA QL: NEGATIVE
FET CHR 21 TS PLAS.CFDNA QL: NEGATIVE
FET MS X RISK WBC.DNA+CFDNA QL: NOT DETECTED
FET SEX PLAS.CFDNA DOSAGE CFDNA: NORMAL
FET TS 13 RISK PLAS.CFDNA QL: NEGATIVE
FET X + Y ANEUP RISK PLAS.CFDNA SEQ-IMP: NOT DETECTED
GESTATION: NORMAL
GESTATIONAL AGE > 9:: YES
LAB DIRECTOR NAME PROVIDER: NORMAL
LABORATORY COMMENT REPORT: NORMAL
LIMITATIONS OF THE TEST: NORMAL
NEGATIVE PREDICTIVE VALUE: NORMAL
PERFORMANCE CHARACTERISTICS: NORMAL
POSITIVE PREDICTIVE VALUE: NORMAL
REF LAB TEST METHOD: NORMAL
SL AMB NOTE:: NORMAL
TEST PERFORMANCE INFO SPEC: NORMAL

## 2024-02-21 PROBLEM — Z13.79 ASHKENAZI JEWISH ANCESTRY REQUIRING POPULATION-SPECIFIC GENETIC SCREENING: Status: RESOLVED | Noted: 2024-02-15 | Resolved: 2024-02-21

## 2024-03-04 ENCOUNTER — ROUTINE PRENATAL (OUTPATIENT)
Dept: PERINATAL CARE | Facility: OTHER | Age: 29
End: 2024-03-04
Payer: COMMERCIAL

## 2024-03-04 VITALS
WEIGHT: 147.4 LBS | SYSTOLIC BLOOD PRESSURE: 124 MMHG | HEIGHT: 64 IN | DIASTOLIC BLOOD PRESSURE: 62 MMHG | HEART RATE: 79 BPM | BODY MASS INDEX: 25.16 KG/M2

## 2024-03-04 DIAGNOSIS — Z3A.16 16 WEEKS GESTATION OF PREGNANCY: ICD-10-CM

## 2024-03-04 DIAGNOSIS — Z36.3 ENCOUNTER FOR ANTENATAL SCREENING FOR MALFORMATIONS: ICD-10-CM

## 2024-03-04 DIAGNOSIS — O28.3 NUCHAL FOLD THICKENING ON PRENATAL ULTRASOUND: Primary | ICD-10-CM

## 2024-03-04 PROCEDURE — 76805 OB US >/= 14 WKS SNGL FETUS: CPT | Performed by: OBSTETRICS & GYNECOLOGY

## 2024-03-04 PROCEDURE — 99213 OFFICE O/P EST LOW 20 MIN: CPT | Performed by: OBSTETRICS & GYNECOLOGY

## 2024-03-04 RX ORDER — FEXOFENADINE HCL 180 MG/1
TABLET ORAL
COMMUNITY

## 2024-03-04 NOTE — PROGRESS NOTES
"[facility]: Ms. Alan was seen today for  early anatomic survey .  See ultrasound report under \"OB Procedures\" tab.   The time spent on this established patient on the encounter date included 5 minutes previsit service time reviewing records and precharting, 7 minutes face-to-face service time counseling regarding results and coordinating care, and  5 minutes charting, totalling 17 minutes.  Please don't hesitate to contact our office with any concerns or questions.  -Alfreda Tejada MD    "

## 2024-03-05 DIAGNOSIS — Z20.1 EXPOSURE TO TB: Primary | ICD-10-CM

## 2024-03-14 ENCOUNTER — ROUTINE PRENATAL (OUTPATIENT)
Dept: OBGYN CLINIC | Facility: CLINIC | Age: 29
End: 2024-03-14
Payer: COMMERCIAL

## 2024-03-14 VITALS
SYSTOLIC BLOOD PRESSURE: 106 MMHG | BODY MASS INDEX: 25.1 KG/M2 | HEIGHT: 64 IN | WEIGHT: 147 LBS | DIASTOLIC BLOOD PRESSURE: 70 MMHG

## 2024-03-14 DIAGNOSIS — O26.892 RH NEGATIVE STATE IN ANTEPARTUM PERIOD, SECOND TRIMESTER: Primary | ICD-10-CM

## 2024-03-14 DIAGNOSIS — O28.3 NUCHAL FOLD THICKENING ON PRENATAL ULTRASOUND: ICD-10-CM

## 2024-03-14 DIAGNOSIS — Z67.91 RH NEGATIVE STATE IN ANTEPARTUM PERIOD, SECOND TRIMESTER: Primary | ICD-10-CM

## 2024-03-14 DIAGNOSIS — Z3A.17 17 WEEKS GESTATION OF PREGNANCY: ICD-10-CM

## 2024-03-14 DIAGNOSIS — Z36.1 NEED FOR MATERNAL SERUM ALPHA-PROTEIN (MSAFP) SCREENING: ICD-10-CM

## 2024-03-14 LAB
SL AMB  POCT GLUCOSE, UA: NORMAL
SL AMB POCT URINE PROTEIN: NORMAL

## 2024-03-14 PROCEDURE — 81002 URINALYSIS NONAUTO W/O SCOPE: CPT | Performed by: OBSTETRICS & GYNECOLOGY

## 2024-03-14 PROCEDURE — PNV: Performed by: OBSTETRICS & GYNECOLOGY

## 2024-03-14 NOTE — ASSESSMENT & PLAN NOTE
Patient declined genetic counseling.  All f/u u/s and aneuploidy testing normal.  Has 20wk anatomy u/s scheduled.

## 2024-03-14 NOTE — PROGRESS NOTES
"Routine Prenatal Visit  [facility] [place]  [address], [address], [place] [address]    Assessment/Plan:  Gay is a 29 y.o. year old  at 17w3d who presents for routine prenatal visit.     1. Rh negative state in antepartum period, second trimester  Assessment & Plan:  Patient aware of blood type and need for Rhogam.      2. Nuchal fold thickening on prenatal ultrasound  Assessment & Plan:  Patient declined genetic counseling.  All f/u u/s and aneuploidy testing normal.  Has 20wk anatomy u/s scheduled.      3. Need for maternal serum alpha-protein (MSAFP) screening  -     Alpha fetoprotein, maternal; Future  -     Alpha fetoprotein, maternal    4. 17 weeks gestation of pregnancy  -     POCT urine dip        Next OB Visit 4 weeks.    Subjective:     CC: Prenatal care    Gay Alan is a 29 y.o.  female who presents for routine prenatal care at 17w3d.  Pregnancy ROS: no leakage of fluid, pelvic pain, or vaginal bleeding.  normal fetal movement.    The following portions of the patient's history were reviewed and updated as appropriate: allergies, current medications, past family history, past medical history, obstetric history, gynecologic history, past social history, past surgical history and problem list.      Objective:  /70 (BP Location: Right arm, Patient Position: Sitting, Cuff Size: Standard)   Ht 5' 4\" (1.626 m)   Wt 66.7 kg (147 lb)   LMP 2023 (Exact Date)   BMI 25.23 kg/m²   Pregravid Weight/BMI: 65.8 kg (145 lb) (BMI 24.88)  Current Weight: 66.7 kg (147 lb)   Total Weight Gain: 0.907 kg (2 lb)   Pre- Vitals      Flowsheet Row Most Recent Value   Prenatal Assessment    Fetal Heart Rate 155   Fundal Height (cm) 17 cm   Movement Present   Prenatal Vitals    Blood Pressure 106/70   Weight - Scale 66.7 kg (147 lb)   Urine Albumin/Glucose    Dilation/Effacement/Station    Vaginal Drainage    Edema    LLE Edema None   RLE Edema None         "     General: Well appearing, no distress  Abdomen: Soft, gravid, nontender  Extremities: Non tender.

## 2024-04-04 LAB
2ND TRIMESTER 4 SCREEN SERPL-IMP: NORMAL
AFP ADJ MOM SERPL: 1.19
AFP INTERP AMN-IMP: NORMAL
AFP INTERP SERPL-IMP: NORMAL
AFP INTERP SERPL-IMP: NORMAL
AFP SERPL-MCNC: 70.2 NG/ML
AGE AT DELIVERY: 30.5 YR
GA METHOD: NORMAL
GA: 20 WEEKS
IDDM PATIENT QL: NO
MULTIPLE PREGNANCY: NO
NEURAL TUBE DEFECT RISK FETUS: 6704 %

## 2024-04-05 ENCOUNTER — ROUTINE PRENATAL (OUTPATIENT)
Dept: PERINATAL CARE | Facility: OTHER | Age: 29
End: 2024-04-05
Payer: COMMERCIAL

## 2024-04-05 VITALS
HEART RATE: 99 BPM | DIASTOLIC BLOOD PRESSURE: 62 MMHG | BODY MASS INDEX: 25.99 KG/M2 | SYSTOLIC BLOOD PRESSURE: 98 MMHG | WEIGHT: 152.2 LBS | HEIGHT: 64 IN

## 2024-04-05 DIAGNOSIS — Z3A.20 20 WEEKS GESTATION OF PREGNANCY: ICD-10-CM

## 2024-04-05 DIAGNOSIS — O28.3 INCREASED NUCHAL TRANSLUCENCY SPACE ON FETAL ULTRASOUND: Primary | ICD-10-CM

## 2024-04-05 DIAGNOSIS — Z36.3 ENCOUNTER FOR ANTENATAL SCREENING FOR MALFORMATIONS: ICD-10-CM

## 2024-04-05 DIAGNOSIS — Z36.86 ENCOUNTER FOR ANTENATAL SCREENING FOR CERVICAL LENGTH: ICD-10-CM

## 2024-04-05 PROCEDURE — 76805 OB US >/= 14 WKS SNGL FETUS: CPT | Performed by: OBSTETRICS & GYNECOLOGY

## 2024-04-05 PROCEDURE — 76817 TRANSVAGINAL US OBSTETRIC: CPT | Performed by: OBSTETRICS & GYNECOLOGY

## 2024-04-05 PROCEDURE — 99213 OFFICE O/P EST LOW 20 MIN: CPT | Performed by: OBSTETRICS & GYNECOLOGY

## 2024-04-05 NOTE — LETTER
April 5, 2024     Giovanna Kirk MD  [address]  [address]  [facility] [address]    Patient: Gay Alan   YOB: 1995   Date of Visit: 4/5/2024       Dear Dr. Kirk:    Thank you for referring Gay Alan to me for evaluation. Below are my notes for this consultation.    If you have questions, please do not hesitate to call me. I look forward to following your patient along with you.         Sincerely,        Fran John MD        CC: No Recipients    Fran John MD  4/5/2024  3:02 PM  Sign when Signing Visit  Please refer to the [facility] ultrasound report in Ob Procedures for additional information regarding today's visit

## 2024-04-11 ENCOUNTER — ROUTINE PRENATAL (OUTPATIENT)
Dept: OBGYN CLINIC | Facility: CLINIC | Age: 29
End: 2024-04-11
Payer: COMMERCIAL

## 2024-04-11 VITALS
DIASTOLIC BLOOD PRESSURE: 62 MMHG | SYSTOLIC BLOOD PRESSURE: 110 MMHG | BODY MASS INDEX: 25.78 KG/M2 | HEIGHT: 64 IN | WEIGHT: 151 LBS

## 2024-04-11 DIAGNOSIS — O26.892 RH NEGATIVE STATE IN ANTEPARTUM PERIOD, SECOND TRIMESTER: ICD-10-CM

## 2024-04-11 DIAGNOSIS — Z67.91 RH NEGATIVE STATE IN ANTEPARTUM PERIOD, SECOND TRIMESTER: ICD-10-CM

## 2024-04-11 DIAGNOSIS — Z3A.21 21 WEEKS GESTATION OF PREGNANCY: Primary | ICD-10-CM

## 2024-04-11 DIAGNOSIS — O28.3 NUCHAL FOLD THICKENING ON PRENATAL ULTRASOUND: ICD-10-CM

## 2024-04-11 LAB
SL AMB  POCT GLUCOSE, UA: NORMAL
SL AMB POCT URINE PROTEIN: NORMAL

## 2024-04-11 PROCEDURE — 81002 URINALYSIS NONAUTO W/O SCOPE: CPT | Performed by: OBSTETRICS & GYNECOLOGY

## 2024-04-11 PROCEDURE — PNV: Performed by: OBSTETRICS & GYNECOLOGY

## 2024-04-11 NOTE — PROGRESS NOTES
"Routine Prenatal Visit  [facility] - [address], [address], [place] [address]    Assessment/Plan:  Gay is a 29 y.o. year old  at 21w3d who presents for routine prenatal visit.     1. 21 weeks gestation of pregnancy  -     POCT urine dip    2. Rh negative state in antepartum period, second trimester    3. Nuchal fold thickening on prenatal ultrasound  Comments:  fetal echo scheduled growth scan at  32 weeks          Subjective:     CC: Prenatal care    Gay Alan is a 29 y.o.  female who presents for routine prenatal care at 21w3d.  Pregnancy ROS: no  leakage of fluid, pelvic pain, or vaginal bleeding.  +  fetal movement.    The following portions of the patient's history were reviewed and updated as appropriate: allergies, current medications, past family history, past medical history, obstetric history, gynecologic history, past social history, past surgical history and problem list.      Objective:  /62 (BP Location: Left arm, Patient Position: Sitting, Cuff Size: Standard)   Ht 5' 4\" (1.626 m)   Wt 68.5 kg (151 lb)   LMP 2023 (Exact Date)   BMI 25.92 kg/m²   Pregravid Weight/BMI: 65.8 kg (145 lb) (BMI 24.88)  Current Weight: 68.5 kg (151 lb)   Total Weight Gain: 2.722 kg (6 lb)   Pre-Janie Vitals    Flowsheet Row Most Recent Value   Prenatal Assessment    Fetal Heart Rate 146   Fundal Height (cm) 21 cm   Movement Absent   Prenatal Vitals    Blood Pressure 110/62   Weight - Scale 68.5 kg (151 lb)   Urine Albumin/Glucose    Dilation/Effacement/Station    Vaginal Drainage    Edema    LLE Edema None   RLE Edema None   Facial Edema None           General: Well appearing, no distress  Respiratory: Unlabored breathing  Cardiovascular: Regular rate.  Abdomen: Soft, gravid, nontender  Fundal Height: Appropriate for gestational age.  Extremities: Warm and well perfused.  Non tender.  "

## 2024-05-03 ENCOUNTER — ROUTINE PRENATAL (OUTPATIENT)
Dept: PERINATAL CARE | Facility: OTHER | Age: 29
End: 2024-05-03
Payer: COMMERCIAL

## 2024-05-03 VITALS
DIASTOLIC BLOOD PRESSURE: 60 MMHG | WEIGHT: 157.2 LBS | SYSTOLIC BLOOD PRESSURE: 102 MMHG | BODY MASS INDEX: 26.84 KG/M2 | HEIGHT: 64 IN | HEART RATE: 85 BPM

## 2024-05-03 DIAGNOSIS — Z3A.24 24 WEEKS GESTATION OF PREGNANCY: ICD-10-CM

## 2024-05-03 DIAGNOSIS — O28.3 INCREASED NUCHAL TRANSLUCENCY SPACE ON FETAL ULTRASOUND: Primary | ICD-10-CM

## 2024-05-03 PROCEDURE — 76825 ECHO EXAM OF FETAL HEART: CPT | Performed by: OBSTETRICS & GYNECOLOGY

## 2024-05-03 PROCEDURE — 93325 DOPPLER ECHO COLOR FLOW MAPG: CPT | Performed by: OBSTETRICS & GYNECOLOGY

## 2024-05-03 PROCEDURE — 76827 ECHO EXAM OF FETAL HEART: CPT | Performed by: OBSTETRICS & GYNECOLOGY

## 2024-05-03 NOTE — LETTER
May 3, 2024     Giovanna Kirk MD  [address]  [address]  [facility] [address]    Patient: Gay Alan   YOB: 1995   Date of Visit: 5/3/2024       Dear Dr. Kirk:    Thank you for referring Gay Alan to me for evaluation. Below are my notes for this consultation.    If you have questions, please do not hesitate to call me. I look forward to following your patient along with you.         Sincerely,        Frna John MD        CC: No Recipients    Fran John MD  5/3/2024  2:43 PM  Sign when Signing Visit  Please refer to the [facility] ultrasound report in Ob Procedures for additional information regarding today's visit

## 2024-05-03 NOTE — PROGRESS NOTES
Please refer to the [facility] ultrasound report in Ob Procedures for additional information regarding today's visit

## 2024-05-08 ENCOUNTER — NURSE TRIAGE (OUTPATIENT)
Age: 29
End: 2024-05-08

## 2024-05-08 NOTE — TELEPHONE ENCOUNTER
"Patient called states she is 25.2 weeks pregnant. States she has had constipation issues entire pregnancy, but today symptoms have worsened where there is a lot of pressure in her rectum. States 7/10 when trying to use restroom. States has urge to have BM and only liquid comes out. States she has not tried any stool softeners at this time. States last solid BM about Friday or Saturday. Has consistent rectal pressure about 4-5/10. Encouraged increasing fluids/electrolytes and using stool softeners - colace or metamucil. Patient states she is concerned about possible fecal impaction. Denies any vaginal bleeding, loss of fluid, abdominal pain/contractions or fever. TT sent to on call provider.    Per Dr. Kirk - I would encourage what you did. If hasn't had BM in days could use Miralax or dulcolax suppository.  Has she had fecal impaction in past? It's pretty rare. Can only be diagnosed really on exam.    Reviewed providers recommendation. Patient denies having fecal impaction in past. Patient agreeable and verbalized understanding. Aware to call if symptoms worsen or no relief from recommendations reviewed. No further questions at this time.    Reason for Disposition   Last bowel movement (BM) > 4 days ago    Answer Assessment - Initial Assessment Questions  1. SYMPTOM:  \"What's the main symptom you're concerned about?\" (e.g., pain, itching, swelling, rash)      Constipation and pressure in rectum  2. ONSET: \"When did the symptom  start?\"      Worsened this morning. Constipated most of pregnancy   3. RECTAL PAIN: \"Do you have any pain around your rectum?\" \"How bad is the pain?\"  (Scale 1-10; or mild, moderate, severe)   - MILD - doesn't interfere with normal activities    - MODERATE - interferes with normal activities or awakens from sleep, limping    - SEVERE - excruciating pain, unable to have a bowel movement       7/10 when sitting on toilet. Consistent 4-5/10  4. RECTAL ITCHING: \"Do you have any itching in this " "area?\" \"How bad is the itching?\"  (Scale 1-10; or mild, moderate, severe)   - MILD - doesn't interfere with normal activities    - MODERATE-SEVERE: interferes with normal activities or awakens from sleep      na  5. CONSTIPATION: \"Do you have constipation?\" If Yes, ask: \"How bad is it?\"      Yes severe  6. CAUSE: \"What do you think is causing the anus symptoms?\"      constipation  7. OTHER SYMPTOMS: \"Do you have any other symptoms?\"  (e.g., abdominal pain, vaginal bleeding, fever)      denies  8. PREGNANCY: \"How many weeks pregnant are you?\"       25.2  9. ALFONSO: \"What date are you expecting to deliver?\"      8/19/24    Protocols used: Pregnancy - Rectal Symptoms-ADULT-AH    "

## 2024-05-09 ENCOUNTER — ROUTINE PRENATAL (OUTPATIENT)
Dept: OBGYN CLINIC | Facility: CLINIC | Age: 29
End: 2024-05-09
Payer: COMMERCIAL

## 2024-05-09 VITALS
WEIGHT: 156 LBS | BODY MASS INDEX: 26.63 KG/M2 | HEIGHT: 64 IN | DIASTOLIC BLOOD PRESSURE: 72 MMHG | SYSTOLIC BLOOD PRESSURE: 110 MMHG

## 2024-05-09 DIAGNOSIS — Z67.91 RH NEGATIVE STATE IN ANTEPARTUM PERIOD, SECOND TRIMESTER: ICD-10-CM

## 2024-05-09 DIAGNOSIS — Z3A.25 25 WEEKS GESTATION OF PREGNANCY: ICD-10-CM

## 2024-05-09 DIAGNOSIS — O26.892 RH NEGATIVE STATE IN ANTEPARTUM PERIOD, SECOND TRIMESTER: ICD-10-CM

## 2024-05-09 DIAGNOSIS — Z36.89 ENCOUNTER FOR OTHER SPECIFIED ANTENATAL SCREENING: Primary | ICD-10-CM

## 2024-05-09 LAB
SL AMB  POCT GLUCOSE, UA: NORMAL
SL AMB POCT URINE PROTEIN: NORMAL

## 2024-05-09 PROCEDURE — 81002 URINALYSIS NONAUTO W/O SCOPE: CPT | Performed by: OBSTETRICS & GYNECOLOGY

## 2024-05-09 PROCEDURE — PNV: Performed by: OBSTETRICS & GYNECOLOGY

## 2024-05-09 NOTE — PROGRESS NOTES
"Routine Prenatal Visit  [facility] - [place]  [address], [place] [address]    Assessment/Plan:  Gay is a 29 y.o. year old  at 25w3d who presents for routine prenatal visit.     1. Encounter for other specified  screening  -     Glucose, 1H PG; Future  -     Antibody screen; Future  -     ABO/Rh; Future  -     CBC; Future  -     RPR, Rfx Qn RPR/Confirm TP; Future  -     Glucose, 1H PG  -     Antibody screen  -     ABO/Rh  -     CBC  -     RPR, Rfx Qn RPR/Confirm TP    2. 25 weeks gestation of pregnancy  Assessment & Plan:  28 wks labs ordered  Reviewed management for constipation    Orders:  -     POCT urine dip    3. Rh negative state in antepartum period, second trimester          Subjective:   Gay Alan is a 29 y.o.  who presents for routine prenatal care at 25w3d.  Complaints today: Denies, reports had a BM yesterday and feels a lot better.   LOF: -; VB: -; Contractions: -; FM: +    Objective:  /72 (BP Location: Right arm, Patient Position: Sitting, Cuff Size: Standard)   Ht 5' 4\" (1.626 m)   Wt 70.8 kg (156 lb)   LMP 2023 (Exact Date)   BMI 26.78 kg/m²     General: Well appearing, no distress  Respiratory: Unlabored breathing  Abdomen: Soft, gravid, nontender  Extremities: Warm and well perfused.  Non tender.    Pregravid Weight/BMI: 65.8 kg (145 lb) (BMI 24.88)  Current Weight: 70.8 kg (156 lb)   Total Weight Gain: 4.99 kg (11 lb)     Pre- Vitals      Flowsheet Row Most Recent Value   Prenatal Assessment    Fetal Heart Rate 140   Movement Present   Prenatal Vitals    Blood Pressure 110/72   Weight - Scale 70.8 kg (156 lb)   Urine Albumin/Glucose    Dilation/Effacement/Station    Vaginal Drainage    Edema              Deepa Calderon DO  2024 10:34 AM    "

## 2024-05-23 LAB
EXTERNAL ANTIBODY SCREEN: NORMAL
EXTERNAL HEMATOCRIT: 35.5 %
EXTERNAL HEMOGLOBIN: 12.2 G/DL
EXTERNAL PLATELET COUNT: 234 K/ÂΜL
EXTERNAL RH FACTOR: NEGATIVE
EXTERNAL SYPHILIS TOTAL IGG/IGM SCREENING: NEGATIVE

## 2024-05-24 LAB
ABO GROUP BLD: NORMAL
BLD GP AB SCN SERPL QL: NEGATIVE
ERYTHROCYTE [DISTWIDTH] IN BLOOD BY AUTOMATED COUNT: 12.4 % (ref 11.7–15.4)
GLUCOSE 1H P 50 G GLC PO SERPL-MCNC: 114 MG/DL (ref 70–139)
HCT VFR BLD AUTO: 35.5 % (ref 34–46.6)
HGB BLD-MCNC: 12.2 G/DL (ref 11.1–15.9)
MCH RBC QN AUTO: 33.4 PG (ref 26.6–33)
MCHC RBC AUTO-ENTMCNC: 34.4 G/DL (ref 31.5–35.7)
MCV RBC AUTO: 97 FL (ref 79–97)
PLATELET # BLD AUTO: 234 X10E3/UL (ref 150–450)
RBC # BLD AUTO: 3.65 X10E6/UL (ref 3.77–5.28)
RH BLD: NEGATIVE
RPR SER QL: NON REACTIVE
WBC # BLD AUTO: 13 X10E3/UL (ref 3.4–10.8)

## 2024-05-28 ENCOUNTER — ROUTINE PRENATAL (OUTPATIENT)
Dept: OBGYN CLINIC | Facility: CLINIC | Age: 29
End: 2024-05-28
Payer: COMMERCIAL

## 2024-05-28 VITALS
HEIGHT: 64 IN | SYSTOLIC BLOOD PRESSURE: 120 MMHG | BODY MASS INDEX: 27.14 KG/M2 | WEIGHT: 159 LBS | DIASTOLIC BLOOD PRESSURE: 64 MMHG

## 2024-05-28 DIAGNOSIS — O28.3 NUCHAL FOLD THICKENING ON PRENATAL ULTRASOUND: ICD-10-CM

## 2024-05-28 DIAGNOSIS — Z67.91 RH NEGATIVE STATE IN ANTEPARTUM PERIOD, SECOND TRIMESTER: Primary | ICD-10-CM

## 2024-05-28 DIAGNOSIS — Z3A.28 28 WEEKS GESTATION OF PREGNANCY: ICD-10-CM

## 2024-05-28 DIAGNOSIS — O26.892 RH NEGATIVE STATE IN ANTEPARTUM PERIOD, SECOND TRIMESTER: Primary | ICD-10-CM

## 2024-05-28 DIAGNOSIS — Z36.85 ENCOUNTER FOR ANTENATAL SCREENING FOR STREPTOCOCCUS B: ICD-10-CM

## 2024-05-28 DIAGNOSIS — Z23 NEED FOR TDAP VACCINATION: ICD-10-CM

## 2024-05-28 LAB
SL AMB  POCT GLUCOSE, UA: NORMAL
SL AMB POCT URINE PROTEIN: NORMAL

## 2024-05-28 PROCEDURE — 81002 URINALYSIS NONAUTO W/O SCOPE: CPT | Performed by: PHYSICIAN ASSISTANT

## 2024-05-28 PROCEDURE — 90471 IMMUNIZATION ADMIN: CPT | Performed by: PHYSICIAN ASSISTANT

## 2024-05-28 PROCEDURE — 90715 TDAP VACCINE 7 YRS/> IM: CPT | Performed by: PHYSICIAN ASSISTANT

## 2024-05-28 PROCEDURE — PNV: Performed by: PHYSICIAN ASSISTANT

## 2024-05-28 NOTE — PROGRESS NOTES
"Routine Prenatal Visit  [facility] - [place]  [address], [address], [place] [address]    Assessment/Plan:  Gay is a 29 y.o. year old  at 28w1d who presents for routine prenatal visit.     1. Rh negative state in antepartum period, second trimester  Assessment & Plan:  No rhogam in office today.   Will schedule to have done this week in [place].   2. 28 weeks gestation of pregnancy  Assessment & Plan:  Reviewed normal 28 weeks labs. Results console updated.   28 week packet given. Reviewed fetal kick counts, breast pump, forms to bring back, pediatricians, and perineal massage.   Tdap given today.   Continue routine prenatal care.   Return to office for ob check in 2 weeks.     Orders:  -     POCT urine dip  3. Encounter for  screening for Streptococcus B  4. Need for Tdap vaccination  -     TDAP VACCINE GREATER THAN OR EQUAL TO 8YO IM  5. Nuchal fold thickening on prenatal ultrasound  Assessment & Plan:  NIPT negative.   Normal fetal ECHO  Has growth 32 weeks scheduled.       Next OB Visit 2 weeks.    Subjective:     CC: Prenatal care    Gay Alan is a 29 y.o.  female who presents for routine prenatal care at 28w1d.  Pregnancy ROS: Good FM, No N/V, HA, cramping, VB, LOF, edema, domestic violence, or smoking. Tolerating PNV.        The following portions of the patient's history were reviewed and updated as appropriate: allergies, current medications, past family history, past medical history, obstetric history, gynecologic history, past social history, past surgical history and problem list.      Objective:  /64 (BP Location: Left arm, Patient Position: Sitting, Cuff Size: Standard)   Ht 5' 4\" (1.626 m)   Wt 72.1 kg (159 lb)   LMP 2023 (Exact Date)   BMI 27.29 kg/m²   Pregravid Weight/BMI: 65.8 kg (145 lb) (BMI 24.88)  Current Weight: 72.1 kg (159 lb)   Total Weight Gain: 6.35 kg (14 lb)   Pre- Vitals      Flowsheet Row Most " Recent Value   Prenatal Assessment    Fetal Heart Rate 142   Fundal Height (cm) 28 cm   Movement Present   Prenatal Vitals    Blood Pressure 120/64   Weight - Scale 72.1 kg (159 lb)   Urine Albumin/Glucose    Dilation/Effacement/Station    Vaginal Drainage    Edema    LLE Edema None   RLE Edema None   Facial Edema None             General: Well appearing, no distress  Abdomen: Soft, gravid, nontender  Fundal Height: Appropriate for gestational age.  Extremities: Warm and well perfused.  Non tender.

## 2024-05-28 NOTE — ASSESSMENT & PLAN NOTE
Reviewed normal 28 weeks labs. Results console updated.   28 week packet given. Reviewed fetal kick counts, breast pump, forms to bring back, pediatricians, and perineal massage.   Tdap given today.   Continue routine prenatal care.   Return to office for ob check in 2 weeks.

## 2024-05-30 ENCOUNTER — CLINICAL SUPPORT (OUTPATIENT)
Dept: OBGYN CLINIC | Facility: CLINIC | Age: 29
End: 2024-05-30
Payer: COMMERCIAL

## 2024-05-30 DIAGNOSIS — Z67.91 RH NEGATIVE STATE IN ANTEPARTUM PERIOD, SECOND TRIMESTER: Primary | ICD-10-CM

## 2024-05-30 DIAGNOSIS — O26.892 RH NEGATIVE STATE IN ANTEPARTUM PERIOD, SECOND TRIMESTER: Primary | ICD-10-CM

## 2024-05-30 DIAGNOSIS — Z36.89 ENCOUNTER FOR OTHER SPECIFIED ANTENATAL SCREENING: ICD-10-CM

## 2024-05-30 PROCEDURE — 96372 THER/PROPH/DIAG INJ SC/IM: CPT | Performed by: OBSTETRICS & GYNECOLOGY

## 2024-06-01 ENCOUNTER — CLINICAL SUPPORT (OUTPATIENT)
Age: 29
End: 2024-06-01

## 2024-06-01 DIAGNOSIS — Z32.2 ENCOUNTER FOR CHILDBIRTH INSTRUCTION: Primary | ICD-10-CM

## 2024-06-10 ENCOUNTER — ROUTINE PRENATAL (OUTPATIENT)
Dept: OBGYN CLINIC | Facility: CLINIC | Age: 29
End: 2024-06-10
Payer: COMMERCIAL

## 2024-06-10 VITALS — WEIGHT: 160.6 LBS | DIASTOLIC BLOOD PRESSURE: 62 MMHG | BODY MASS INDEX: 27.57 KG/M2 | SYSTOLIC BLOOD PRESSURE: 106 MMHG

## 2024-06-10 DIAGNOSIS — O28.3 NUCHAL FOLD THICKENING ON PRENATAL ULTRASOUND: ICD-10-CM

## 2024-06-10 DIAGNOSIS — O26.892 RH NEGATIVE STATE IN ANTEPARTUM PERIOD, SECOND TRIMESTER: ICD-10-CM

## 2024-06-10 DIAGNOSIS — Z3A.30 30 WEEKS GESTATION OF PREGNANCY: Primary | ICD-10-CM

## 2024-06-10 DIAGNOSIS — Z67.91 RH NEGATIVE STATE IN ANTEPARTUM PERIOD, SECOND TRIMESTER: ICD-10-CM

## 2024-06-10 LAB
SL AMB  POCT GLUCOSE, UA: NEGATIVE
SL AMB POCT URINE PROTEIN: NEGATIVE

## 2024-06-10 PROCEDURE — 81002 URINALYSIS NONAUTO W/O SCOPE: CPT | Performed by: NURSE PRACTITIONER

## 2024-06-10 PROCEDURE — PNV: Performed by: NURSE PRACTITIONER

## 2024-06-10 NOTE — PROGRESS NOTES
Routine Prenatal Visit  [facility] - [place]  [address], [address], [place] [address]    Assessment/Plan:  Gay is a 29 y.o. year old  at 30w0d who presents for routine prenatal visit.     1. 30 weeks gestation of pregnancy  -     POCT urine dip  2. Rh negative state in antepartum period, second trimester  3. Nuchal fold thickening on prenatal ultrasound  Assessment & Plan:  Growth US scheduled    S/P Rhogam, Tdap  Growth US at 32 weeks  Reviewed s/s ptl, fmc  Next OB Visit 2 weeks.    Subjective:     CC: Prenatal care    Gay Alan is a 29 y.o.  female who presents for routine prenatal care at 30w0d.  Feels well, no complaints.   Pregnancy ROS: no leakage of fluid, pelvic pain, or vaginal bleeding.  normal fetal movement.    The following portions of the patient's history were reviewed and updated as appropriate: allergies, current medications, past family history, past medical history, obstetric history, gynecologic history, past social history, past surgical history and problem list.      Objective:  /62   Wt 72.8 kg (160 lb 9.6 oz)   LMP 2023 (Exact Date)   BMI 27.57 kg/m²   Pregravid Weight/BMI: 65.8 kg (145 lb) (BMI 24.88)  Current Weight: 72.8 kg (160 lb 9.6 oz)   Total Weight Gain: 7.076 kg (15 lb 9.6 oz)   Pre- Vitals      Flowsheet Row Most Recent Value   Prenatal Assessment    Fetal Heart Rate 150   Fundal Height (cm) 30 cm   Movement Present   Presentation Vertex   Prenatal Vitals    Blood Pressure 106/62   Weight - Scale 72.8 kg (160 lb 9.6 oz)   Urine Albumin/Glucose    Dilation/Effacement/Station    Vaginal Drainage    Draining Fluid No   Edema    LLE Edema None   RLE Edema None             General: Well appearing, no distress  Abdomen: Soft, gravid, nontender  Extremities: Non tender.

## 2024-06-24 ENCOUNTER — ROUTINE PRENATAL (OUTPATIENT)
Dept: OBGYN CLINIC | Facility: CLINIC | Age: 29
End: 2024-06-24
Payer: COMMERCIAL

## 2024-06-24 VITALS — SYSTOLIC BLOOD PRESSURE: 102 MMHG | DIASTOLIC BLOOD PRESSURE: 66 MMHG | BODY MASS INDEX: 27.77 KG/M2 | WEIGHT: 161.8 LBS

## 2024-06-24 DIAGNOSIS — Z67.91 RH NEGATIVE STATE IN ANTEPARTUM PERIOD, SECOND TRIMESTER: ICD-10-CM

## 2024-06-24 DIAGNOSIS — O26.892 RH NEGATIVE STATE IN ANTEPARTUM PERIOD, SECOND TRIMESTER: ICD-10-CM

## 2024-06-24 DIAGNOSIS — Z3A.32 32 WEEKS GESTATION OF PREGNANCY: Primary | ICD-10-CM

## 2024-06-24 DIAGNOSIS — O28.3 NUCHAL FOLD THICKENING ON PRENATAL ULTRASOUND: ICD-10-CM

## 2024-06-24 LAB
SL AMB  POCT GLUCOSE, UA: NEGATIVE
SL AMB POCT URINE PROTEIN: NEGATIVE

## 2024-06-24 PROCEDURE — PNV: Performed by: NURSE PRACTITIONER

## 2024-06-24 PROCEDURE — 81002 URINALYSIS NONAUTO W/O SCOPE: CPT | Performed by: NURSE PRACTITIONER

## 2024-06-24 NOTE — PROGRESS NOTES
Routine Prenatal Visit  [facility] - [place]  [address], [address], [place] [address]    Assessment/Plan:  Gay is a 29 y.o. year old  at 32w0d who presents for routine prenatal visit.     1. 32 weeks gestation of pregnancy  -     POCT urine dip  2. Rh negative state in antepartum period, second trimester  3. Nuchal fold thickening on prenatal ultrasound  Assessment & Plan:  Growth @ 32 weeks,     Reviewed S/S PTL, FMC, call with any concerns  Next OB Visit 2 weeks.    Subjective:     CC: Prenatal care    Gay Alan is a 29 y.o.  female who presents for routine prenatal care at 32w0d. Pt feels well, no complaints today.   Pregnancy ROS: no leakage of fluid, pelvic pain, or vaginal bleeding.  normal fetal movement.    The following portions of the patient's history were reviewed and updated as appropriate: allergies, current medications, past family history, past medical history, obstetric history, gynecologic history, past social history, past surgical history and problem list.      Objective:  /66   Wt 73.4 kg (161 lb 12.8 oz)   LMP 2023 (Exact Date)   BMI 27.77 kg/m²   Pregravid Weight/BMI: 65.8 kg (145 lb) (BMI 24.88)  Current Weight: 73.4 kg (161 lb 12.8 oz)   Total Weight Gain: 7.62 kg (16 lb 12.8 oz)   Pre- Vitals      Flowsheet Row Most Recent Value   Prenatal Assessment    Fetal Heart Rate 145   Movement Present   Presentation Vertex   Prenatal Vitals    Blood Pressure 102/66   Weight - Scale 73.4 kg (161 lb 12.8 oz)   Urine Albumin/Glucose    Dilation/Effacement/Station    Vaginal Drainage    Draining Fluid No   Edema    LLE Edema None   RLE Edema None   Facial Edema None             General: Well appearing, no distress  Abdomen: Soft, gravid, nontender  Extremities: Non tender.

## 2024-06-27 NOTE — PROGRESS NOTES
Please refer to the [facility] ultrasound report in Ob Procedures for additional information regarding today's visit

## 2024-06-28 ENCOUNTER — ULTRASOUND (OUTPATIENT)
Dept: PERINATAL CARE | Facility: OTHER | Age: 29
End: 2024-06-28
Payer: COMMERCIAL

## 2024-06-28 VITALS
DIASTOLIC BLOOD PRESSURE: 62 MMHG | BODY MASS INDEX: 27.72 KG/M2 | HEIGHT: 64 IN | SYSTOLIC BLOOD PRESSURE: 108 MMHG | HEART RATE: 102 BPM | WEIGHT: 162.4 LBS

## 2024-06-28 DIAGNOSIS — Z36.4 ULTRASOUND FOR ANTENATAL SCREENING FOR FETAL GROWTH RESTRICTION: Primary | ICD-10-CM

## 2024-06-28 DIAGNOSIS — Z3A.32 32 WEEKS GESTATION OF PREGNANCY: ICD-10-CM

## 2024-06-28 DIAGNOSIS — O28.3 INCREASED NUCHAL TRANSLUCENCY SPACE ON FETAL ULTRASOUND: ICD-10-CM

## 2024-06-28 LAB
DME PARACHUTE DELIVERY DATE REQUESTED: NORMAL
DME PARACHUTE ITEM DESCRIPTION: NORMAL
DME PARACHUTE ORDER STATUS: NORMAL
DME PARACHUTE SUPPLIER NAME: NORMAL
DME PARACHUTE SUPPLIER PHONE: NORMAL

## 2024-06-28 PROCEDURE — 99213 OFFICE O/P EST LOW 20 MIN: CPT | Performed by: OBSTETRICS & GYNECOLOGY

## 2024-06-28 PROCEDURE — 76816 OB US FOLLOW-UP PER FETUS: CPT | Performed by: OBSTETRICS & GYNECOLOGY

## 2024-06-28 NOTE — LETTER
2024     Deepa Calderon V,   [address]  [address]  [place] [address]    Patient: Gay Alan   YOB: 1995   Date of Visit: 2024       Dear Dr. Calderon:    Thank you for referring Gay Alan to me for evaluation. Below are my notes for this consultation.    If you have questions, please do not hesitate to call me. I look forward to following your patient along with you.         Sincerely,         US 1 UPPER PERK        CC: No Recipients    Fran John MD  2024  2:11 PM  Sign when Signing Visit  Please refer to the [facility] ultrasound report in Ob Procedures for additional information regarding today's visit

## 2024-06-28 NOTE — PATIENT INSTRUCTIONS
"Patient Education     Your baby's movement before birth   The Basics   Written by the doctors and editors at [facility]   When should I start feeling my baby move? -- It depends. Most people first feel their baby moving in the uterus between about 16 and 20 weeks of pregnancy. It might take longer to feel movement if this is your first pregnancy or if the placenta is in the front of your uterus.  What kinds of movements should I feel? -- When you first feel your baby move, it might feel like a gentle flutter in your belly. This is sometimes called \"quickening.\" As the baby grows, their movements will get stronger. You will probably feel them kicking, rolling, and stretching. Later in pregnancy, you might be able to see and feel the baby moving from the outside.  You might notice that your baby is more active at certain times of the day or night. Even before birth, babies have periods of being asleep and awake. When your baby is sleeping, you might notice that they do not move as much.  Should I keep track of my baby's movements? -- If your pregnancy is healthy, you probably do not need to count or record your baby's movements. Feeling regular movement is a good sign that the baby is doing well.  In some cases, your doctor or midwife might ask you to keep track of your baby's movements. If so, they will tell you how to do this and when to call them.  A change in your baby's movements does not always mean that there is a problem. But in some cases, it can be a sign that the baby is having trouble. If your doctor or midwife is concerned, they can do tests to check on the baby.  If I am asked to track movement, how should I do it? -- There are different ways of tracking your baby's movement. This is sometimes called \"kick counting.\"  Your doctor or midwife will tell you exactly what to track. For example, they might ask you to write down:   How long it takes to feel 10 kicks or movements   How many times your baby moves " in 1 hour  Many experts consider at least 10 movements in 2 hours to be a sign that the baby is doing well. But there is no specific cutoff for exactly how much movement is healthy or unhealthy. Some babies are more active than others, and some pregnant people feel movement more easily than others. The main goal of kick counting is to get to know your baby's normal patterns so you can tell if anything changes.  If you are doing kick counting:   Choose a time of day when your baby is usually active.   Find a quiet place where you will not be distracted.   Lie down on your side in a comfortable position.   Check the clock, or set a timer.   Each time you feel your baby move or kick, write down the time. Some people use a smartphone cassy to keep track.   If your baby seems less active than usual, try moving around, eating a snack, and emptying your bladder. This can help wake the baby up if they are asleep.   Stop counting after you have felt 10 kicks, or after the length of time your doctor or midwife told you.  When should I call the doctor? -- Call your doctor or midwife for advice if:   You have concerns about your baby's movement.   Your baby is moving less than they normally do.   You notice a sudden change in the pattern of your baby's movements.   You have any other symptoms that worry you.  All topics are updated as new evidence becomes available and our peer review process is complete.  This topic retrieved from Last 2 Left on: Feb 26, 2024.  Topic 980665 Version 1.0  Release: 32.2.4 - C32.56  © 2024 UpToDate, Inc. and/or its affiliates. All rights reserved.  Consumer Information Use and Disclaimer   Disclaimer: This generalized information is a limited summary of diagnosis, treatment, and/or medication information. It is not meant to be comprehensive and should be used as a tool to help the user understand and/or assess potential diagnostic and treatment options. It does NOT include all information about  conditions, treatments, medications, side effects, or risks that may apply to a specific patient. It is not intended to be medical advice or a substitute for the medical advice, diagnosis, or treatment of a health care provider based on the health care provider's examination and assessment of a patient's specific and unique circumstances. Patients must speak with a health care provider for complete information about their health, medical questions, and treatment options, including any risks or benefits regarding use of medications. This information does not endorse any treatments or medications as safe, effective, or approved for treating a specific patient. UpToDate, Inc. and its affiliates disclaim any warranty or liability relating to this information or the use thereof.The use of this information is governed by the Terms of Use, available at https://www.woltersOlson Networksuwer.com/en/know/clinical-effectiveness-terms. 2024© UpToDate, Inc. and its affiliates and/or licensors. All rights reserved.  Copyright   © 2024 UpToDate, Inc. and/or its affiliates. All rights reserved.

## 2024-07-08 ENCOUNTER — ROUTINE PRENATAL (OUTPATIENT)
Dept: OBGYN CLINIC | Facility: CLINIC | Age: 29
End: 2024-07-08
Payer: COMMERCIAL

## 2024-07-08 VITALS
HEIGHT: 64 IN | DIASTOLIC BLOOD PRESSURE: 74 MMHG | BODY MASS INDEX: 27.83 KG/M2 | WEIGHT: 163 LBS | SYSTOLIC BLOOD PRESSURE: 110 MMHG

## 2024-07-08 DIAGNOSIS — Z67.91 RH NEGATIVE STATE IN ANTEPARTUM PERIOD, SECOND TRIMESTER: ICD-10-CM

## 2024-07-08 DIAGNOSIS — O28.3 NUCHAL FOLD THICKENING ON PRENATAL ULTRASOUND: ICD-10-CM

## 2024-07-08 DIAGNOSIS — O26.892 RH NEGATIVE STATE IN ANTEPARTUM PERIOD, SECOND TRIMESTER: ICD-10-CM

## 2024-07-08 DIAGNOSIS — Z3A.34 34 WEEKS GESTATION OF PREGNANCY: Primary | ICD-10-CM

## 2024-07-08 LAB
DME PARACHUTE DELIVERY DATE REQUESTED: NORMAL
DME PARACHUTE ITEM DESCRIPTION: NORMAL
DME PARACHUTE ORDER STATUS: NORMAL
DME PARACHUTE SUPPLIER NAME: NORMAL
DME PARACHUTE SUPPLIER PHONE: NORMAL
SL AMB  POCT GLUCOSE, UA: NORMAL
SL AMB POCT URINE PROTEIN: NORMAL

## 2024-07-08 PROCEDURE — 81002 URINALYSIS NONAUTO W/O SCOPE: CPT | Performed by: OBSTETRICS & GYNECOLOGY

## 2024-07-08 PROCEDURE — PNV: Performed by: OBSTETRICS & GYNECOLOGY

## 2024-07-08 NOTE — PROGRESS NOTES
"Routine Prenatal Visit  [facility] - [address], [address], [place] [address]    Assessment/Plan:  Gay is a 29 y.o. year old  at 34w0d who presents for routine prenatal visit.     1. 34 weeks gestation of pregnancy  -     POCT urine dip  2. Nuchal fold thickening on prenatal ultrasound  Comments:  NIPT negative.   Normal fetal ECHO  Had growth 32 weeks scheduled, normal  3. Rh negative state in antepartum period, second trimester       -  Rhogam given at 28 weeks    Next OB Visit 2 weeks.    Subjective:     CC: Prenatal care    Gay Alan is a 29 y.o.  female who presents for routine prenatal care at 34w0d.  Pregnancy ROS: no leakage of fluid, pelvic pain, or vaginal bleeding.  normal fetal movement.    The following portions of the patient's history were reviewed and updated as appropriate: allergies, current medications, past family history, past medical history, obstetric history, gynecologic history, past social history, past surgical history and problem list.      Objective:  /74 (BP Location: Right arm, Patient Position: Sitting, Cuff Size: Standard)   Ht 5' 4\" (1.626 m)   Wt 73.9 kg (163 lb)   LMP 2023 (Exact Date)   BMI 27.98 kg/m²   Pregravid Weight/BMI: 65.8 kg (145 lb) (BMI 24.88)  Current Weight: 73.9 kg (163 lb)   Total Weight Gain: 8.165 kg (18 lb)   Pre- Vitals      Flowsheet Row Most Recent Value   Prenatal Assessment    Fetal Heart Rate 150   Fundal Height (cm) 34 cm   Movement Present   Prenatal Vitals    Blood Pressure 110/74   Weight - Scale 73.9 kg (163 lb)   Urine Albumin/Glucose    Dilation/Effacement/Station    Vaginal Drainage    Draining Fluid No   Edema              General: Well appearing, no distress  Abdomen: Soft, gravid, nontender  Extremities: Non tender.  "

## 2024-07-20 ENCOUNTER — CLINICAL SUPPORT (OUTPATIENT)
Age: 29
End: 2024-07-20

## 2024-07-20 DIAGNOSIS — Z32.2 ENCOUNTER FOR CHILDBIRTH INSTRUCTION: Primary | ICD-10-CM

## 2024-07-24 PROBLEM — Z3A.36 36 WEEKS GESTATION OF PREGNANCY: Status: ACTIVE | Noted: 2024-05-09

## 2024-07-26 ENCOUNTER — ROUTINE PRENATAL (OUTPATIENT)
Dept: OBGYN CLINIC | Facility: CLINIC | Age: 29
End: 2024-07-26

## 2024-07-26 VITALS
WEIGHT: 168 LBS | DIASTOLIC BLOOD PRESSURE: 72 MMHG | BODY MASS INDEX: 28.68 KG/M2 | SYSTOLIC BLOOD PRESSURE: 112 MMHG | HEIGHT: 64 IN

## 2024-07-26 DIAGNOSIS — Z3A.36 36 WEEKS GESTATION OF PREGNANCY: ICD-10-CM

## 2024-07-26 DIAGNOSIS — O26.893 RH NEGATIVE STATE IN ANTEPARTUM PERIOD, THIRD TRIMESTER: Primary | ICD-10-CM

## 2024-07-26 DIAGNOSIS — Z36.85 ENCOUNTER FOR ANTENATAL SCREENING FOR STREPTOCOCCUS B: ICD-10-CM

## 2024-07-26 DIAGNOSIS — Z67.91 RH NEGATIVE STATE IN ANTEPARTUM PERIOD, THIRD TRIMESTER: Primary | ICD-10-CM

## 2024-07-26 PROCEDURE — PNV: Performed by: OBSTETRICS & GYNECOLOGY

## 2024-07-26 NOTE — PROGRESS NOTES
"Routine Prenatal Visit  [facility] - [place]  [address], [address], [place] [address]    Assessment/Plan:  Gay is a 29 y.o. year old  at 36w4d who presents for routine prenatal visit.     1. Rh negative state in antepartum period, third trimester  Assessment & Plan:  Received Rhogam at 28 weeks.  Will evaluate postpartum.  2. Encounter for  screening for Streptococcus B  -     Strep B DNA probe, amplification  3. 36 weeks gestation of pregnancy  -     POCT urine dip      Next OB Visit 1 weeks.    Subjective:     CC: Prenatal care    Gay Alan is a 29 y.o.  female who presents for routine prenatal care at 36w4d.  Pregnancy ROS: no leakage of fluid, pelvic pain, or vaginal bleeding.  normal fetal movement.    The following portions of the patient's history were reviewed and updated as appropriate: allergies, current medications, past family history, past medical history, obstetric history, gynecologic history, past social history, past surgical history and problem list.      Objective:  /72 (BP Location: Right arm, Patient Position: Sitting, Cuff Size: Standard)   Ht 5' 4\" (1.626 m)   Wt 76.2 kg (168 lb)   LMP 2023 (Exact Date)   BMI 28.84 kg/m²   Pregravid Weight/BMI: 65.8 kg (145 lb) (BMI 24.88)  Current Weight: 76.2 kg (168 lb)   Total Weight Gain: 10.4 kg (23 lb)   Pre-Janie Vitals      Flowsheet Row Most Recent Value   Prenatal Assessment    Fetal Heart Rate 150   Fundal Height (cm) 36 cm   Movement Present   Presentation Vertex   Prenatal Vitals    Blood Pressure 112/72   Weight - Scale 76.2 kg (168 lb)   Urine Albumin/Glucose    Dilation/Effacement/Station    Cervical Dilation 0   Cervical Effacement 0   Fetal Station -3   Vaginal Drainage    Edema    LLE Edema None   RLE Edema None             General: Well appearing, no distress  Abdomen: Soft, gravid, nontender  Extremities: Non tender.  "

## 2024-07-27 ENCOUNTER — CLINICAL SUPPORT (OUTPATIENT)
Age: 29
End: 2024-07-27

## 2024-07-27 DIAGNOSIS — Z32.2 ENCOUNTER FOR CHILDBIRTH INSTRUCTION: Primary | ICD-10-CM

## 2024-07-28 LAB — GP B STREP DNA SPEC QL NAA+PROBE: NEGATIVE

## 2024-08-02 ENCOUNTER — ROUTINE PRENATAL (OUTPATIENT)
Dept: OBGYN CLINIC | Facility: CLINIC | Age: 29
End: 2024-08-02
Payer: COMMERCIAL

## 2024-08-02 VITALS
HEIGHT: 64 IN | BODY MASS INDEX: 28.85 KG/M2 | SYSTOLIC BLOOD PRESSURE: 110 MMHG | DIASTOLIC BLOOD PRESSURE: 72 MMHG | WEIGHT: 169 LBS

## 2024-08-02 DIAGNOSIS — O26.893 RH NEGATIVE STATE IN ANTEPARTUM PERIOD, THIRD TRIMESTER: Primary | ICD-10-CM

## 2024-08-02 DIAGNOSIS — Z3A.37 37 WEEKS GESTATION OF PREGNANCY: ICD-10-CM

## 2024-08-02 DIAGNOSIS — Z67.91 RH NEGATIVE STATE IN ANTEPARTUM PERIOD, THIRD TRIMESTER: Primary | ICD-10-CM

## 2024-08-02 LAB
SL AMB  POCT GLUCOSE, UA: NORMAL
SL AMB POCT URINE PROTEIN: NORMAL

## 2024-08-02 PROCEDURE — 81002 URINALYSIS NONAUTO W/O SCOPE: CPT | Performed by: OBSTETRICS & GYNECOLOGY

## 2024-08-02 PROCEDURE — PNV: Performed by: OBSTETRICS & GYNECOLOGY

## 2024-08-02 NOTE — PROGRESS NOTES
"Routine Prenatal Visit  [facility] [address], [address], [place] [address]    Assessment/Plan:  Gay is a 29 y.o. year old  at 37w4d who presents for routine prenatal visit.     1. Rh negative state in antepartum period, third trimester  2. 37 weeks gestation of pregnancy  Assessment & Plan:  Reviewed signs of labor and [facility]  Orders:  -     POCT urine dip        Subjective:   Gay Alan is a 29 y.o.  who presents for routine prenatal care at 37w4d.  Complaints today: Denies  LOF: -; VB: -; Contractions: -; FM: +    Objective:  /72 (BP Location: Right arm, Patient Position: Sitting, Cuff Size: Standard)   Ht 5' 4\" (1.626 m)   Wt 76.7 kg (169 lb)   LMP 2023 (Exact Date)   BMI 29.01 kg/m²     General: Well appearing, no distress  Respiratory: Unlabored breathing  Abdomen: Soft, gravid, nontender  Extremities: Warm and well perfused.  Non tender.    Pregravid Weight/BMI: 65.8 kg (145 lb) (BMI 24.88)  Current Weight: 76.7 kg (169 lb)   Total Weight Gain: 10.9 kg (24 lb)     Pre- Vitals      Flowsheet Row Most Recent Value   Prenatal Assessment    Fetal Heart Rate 160   Fundal Height (cm) 37 cm   Movement Present   Presentation Vertex   Prenatal Vitals    Blood Pressure 110/72   Weight - Scale 76.7 kg (169 lb)   Urine Albumin/Glucose    Dilation/Effacement/Station    Vaginal Drainage    Edema              Deepa Calderon DO  2024 11:26 AM     "

## 2024-08-08 ENCOUNTER — ROUTINE PRENATAL (OUTPATIENT)
Dept: OBGYN CLINIC | Facility: CLINIC | Age: 29
End: 2024-08-08
Payer: COMMERCIAL

## 2024-08-08 VITALS
HEIGHT: 64 IN | SYSTOLIC BLOOD PRESSURE: 104 MMHG | DIASTOLIC BLOOD PRESSURE: 72 MMHG | BODY MASS INDEX: 29.19 KG/M2 | WEIGHT: 171 LBS

## 2024-08-08 DIAGNOSIS — Z67.91 RH NEGATIVE STATE IN ANTEPARTUM PERIOD, THIRD TRIMESTER: Primary | ICD-10-CM

## 2024-08-08 DIAGNOSIS — O26.893 RH NEGATIVE STATE IN ANTEPARTUM PERIOD, THIRD TRIMESTER: Primary | ICD-10-CM

## 2024-08-08 DIAGNOSIS — Z3A.38 38 WEEKS GESTATION OF PREGNANCY: ICD-10-CM

## 2024-08-08 LAB
SL AMB  POCT GLUCOSE, UA: NORMAL
SL AMB POCT URINE PROTEIN: NORMAL

## 2024-08-08 PROCEDURE — 81002 URINALYSIS NONAUTO W/O SCOPE: CPT | Performed by: OBSTETRICS & GYNECOLOGY

## 2024-08-08 PROCEDURE — PNV: Performed by: OBSTETRICS & GYNECOLOGY

## 2024-08-08 NOTE — PROGRESS NOTES
"Routine Prenatal Visit  [facility] - [place]  [address] [place] [address]    Assessment/Plan:  Gay is a 29 y.o. year old  at 38w3d who presents for routine prenatal visit.     1. Rh negative state in antepartum period, third trimester  2. 38 weeks gestation of pregnancy  -     POCT urine dip        Subjective:     CC: Prenatal care    Gay Alan is a 29 y.o.  female who presents for routine prenatal care at 38w3d.  Pregnancy ROS: no leakage of fluid, pelvic pain, or vaginal bleeding.  normal fetal movement.    The following portions of the patient's history were reviewed and updated as appropriate: allergies, current medications, past family history, past medical history, obstetric history, gynecologic history, past social history, past surgical history and problem list.      Objective:  /72 (BP Location: Left arm, Patient Position: Sitting, Cuff Size: Standard)   Ht 5' 4\" (1.626 m)   Wt 77.6 kg (171 lb)   LMP 2023 (Exact Date)   BMI 29.35 kg/m²   Pregravid Weight/BMI: 65.8 kg (145 lb) (BMI 24.88)  Current Weight: 77.6 kg (171 lb)   Total Weight Gain: 11.8 kg (26 lb)   Pre-Janie Vitals      Flowsheet Row Most Recent Value   Prenatal Assessment    Fetal Heart Rate 140   Fundal Height (cm) 38 cm   Movement Present   Presentation Vertex   Prenatal Vitals    Blood Pressure 104/72   Weight - Scale 77.6 kg (171 lb)   Urine Albumin/Glucose    Dilation/Effacement/Station    Vaginal Drainage    Edema              General: Well appearing, no distress  Respiratory: Unlabored breathing  Cardiovascular: Regular rate.  Abdomen: Soft, gravid, nontender  Fundal Height: Appropriate for gestational age.  Extremities: Warm and well perfused.  Non tender.  "

## 2024-08-15 ENCOUNTER — ROUTINE PRENATAL (OUTPATIENT)
Dept: OBGYN CLINIC | Facility: CLINIC | Age: 29
End: 2024-08-15
Payer: COMMERCIAL

## 2024-08-15 VITALS
DIASTOLIC BLOOD PRESSURE: 68 MMHG | SYSTOLIC BLOOD PRESSURE: 106 MMHG | WEIGHT: 171.6 LBS | BODY MASS INDEX: 29.3 KG/M2 | HEIGHT: 64 IN

## 2024-08-15 DIAGNOSIS — O26.893 RH NEGATIVE STATE IN ANTEPARTUM PERIOD, THIRD TRIMESTER: Primary | ICD-10-CM

## 2024-08-15 DIAGNOSIS — Z3A.39 39 WEEKS GESTATION OF PREGNANCY: ICD-10-CM

## 2024-08-15 DIAGNOSIS — Z67.91 RH NEGATIVE STATE IN ANTEPARTUM PERIOD, THIRD TRIMESTER: Primary | ICD-10-CM

## 2024-08-15 LAB
SL AMB  POCT GLUCOSE, UA: NORMAL
SL AMB POCT URINE PROTEIN: NORMAL

## 2024-08-15 PROCEDURE — PNV: Performed by: OBSTETRICS & GYNECOLOGY

## 2024-08-15 PROCEDURE — 81002 URINALYSIS NONAUTO W/O SCOPE: CPT | Performed by: OBSTETRICS & GYNECOLOGY

## 2024-08-15 NOTE — PROGRESS NOTES
"Routine Prenatal Visit  [facility] - [place]  [address] [place] [address]    Assessment/Plan:  Gay is a 29 y.o. year old  at 39w3d who presents for routine prenatal visit.     1. Rh negative state in antepartum period, third trimester  2. 39 weeks gestation of pregnancy  Assessment & Plan:  Interested in elective labor induction. Cervical ripening scheduled for the evening of 24.  Orders:  -     POCT urine dip        Subjective:     CC: Prenatal care    Gay Alan is a 29 y.o.  female who presents for routine prenatal care at 39w3d.  Pregnancy ROS: no leakage of fluid, pelvic pain, or vaginal bleeding.  normal fetal movement.    The following portions of the patient's history were reviewed and updated as appropriate: allergies, current medications, past family history, past medical history, obstetric history, gynecologic history, past social history, past surgical history and problem list.      Objective:  /68 (BP Location: Left arm, Patient Position: Sitting, Cuff Size: Standard)   Ht 5' 4\" (1.626 m)   Wt 77.8 kg (171 lb 9.6 oz)   LMP 2023 (Exact Date)   BMI 29.46 kg/m²   Pregravid Weight/BMI: 65.8 kg (145 lb) (BMI 24.88)  Current Weight: 77.8 kg (171 lb 9.6 oz)   Total Weight Gain: 12.1 kg (26 lb 9.6 oz)   Pre- Vitals      Flowsheet Row Most Recent Value   Prenatal Assessment    Fetal Heart Rate 140   Fundal Height (cm) 40 cm   Movement Present   Presentation Vertex   Prenatal Vitals    Blood Pressure 106/68   Weight - Scale 77.8 kg (171 lb 9.6 oz)   Urine Albumin/Glucose    Dilation/Effacement/Station    Cervical Dilation .5   Cervical Effacement 30   Fetal Station -2   Vaginal Drainage    Edema              General: Well appearing, no distress  Respiratory: Unlabored breathing  Cardiovascular: Regular rate.  Abdomen: Soft, gravid, nontender  Fundal Height: Appropriate for gestational age.  Extremities: Warm and well perfused.  Non tender.  "

## 2024-08-19 ENCOUNTER — HOSPITAL ENCOUNTER (INPATIENT)
Facility: HOSPITAL | Age: 29
LOS: 4 days | Discharge: HOME | End: 2024-08-23
Attending: OBSTETRICS & GYNECOLOGY | Admitting: OBSTETRICS & GYNECOLOGY
Payer: COMMERCIAL

## 2024-08-19 ENCOUNTER — HOSPITAL ENCOUNTER (OUTPATIENT)
Dept: LABOR AND DELIVERY | Facility: HOSPITAL | Age: 29
Discharge: HOME | End: 2024-08-19
Payer: COMMERCIAL

## 2024-08-19 DIAGNOSIS — Z34.90 ENCOUNTER FOR ELECTIVE INDUCTION OF LABOR: Primary | ICD-10-CM

## 2024-08-19 PROBLEM — Z3A.39 39 WEEKS GESTATION OF PREGNANCY: Status: ACTIVE | Noted: 2024-05-09

## 2024-08-19 LAB
ABO GROUP BLD: NORMAL
BASOPHILS # BLD AUTO: 0.04 THOUSANDS/ÂΜL (ref 0–0.1)
BASOPHILS NFR BLD AUTO: 0 % (ref 0–1)
BLD GP AB SCN SERPL QL: POSITIVE
EOSINOPHIL # BLD AUTO: 0.04 THOUSAND/ÂΜL (ref 0–0.61)
EOSINOPHIL NFR BLD AUTO: 0 % (ref 0–6)
ERYTHROCYTE [DISTWIDTH] IN BLOOD BY AUTOMATED COUNT: 14 % (ref 11.6–15.1)
HCT VFR BLD AUTO: 38.4 % (ref 34.8–46.1)
HGB BLD-MCNC: 13.1 G/DL (ref 11.5–15.4)
IMM GRANULOCYTES # BLD AUTO: 0.08 THOUSAND/UL (ref 0–0.2)
IMM GRANULOCYTES NFR BLD AUTO: 1 % (ref 0–2)
LYMPHOCYTES # BLD AUTO: 2.3 THOUSANDS/ÂΜL (ref 0.6–4.47)
LYMPHOCYTES NFR BLD AUTO: 20 % (ref 14–44)
MCH RBC QN AUTO: 32.3 PG (ref 26.8–34.3)
MCHC RBC AUTO-ENTMCNC: 34.1 G/DL (ref 31.4–37.4)
MCV RBC AUTO: 95 FL (ref 82–98)
MONOCYTES # BLD AUTO: 0.8 THOUSAND/ÂΜL (ref 0.17–1.22)
MONOCYTES NFR BLD AUTO: 7 % (ref 4–12)
NEUTROPHILS # BLD AUTO: 8.32 THOUSANDS/ÂΜL (ref 1.85–7.62)
NEUTS SEG NFR BLD AUTO: 72 % (ref 43–75)
NRBC BLD AUTO-RTO: 0 /100 WBCS
PLATELET # BLD AUTO: 192 THOUSANDS/UL (ref 149–390)
PMV BLD AUTO: 10.8 FL (ref 8.9–12.7)
RBC # BLD AUTO: 4.05 MILLION/UL (ref 3.81–5.12)
RH BLD: NEGATIVE
SPECIMEN EXPIRATION DATE: NORMAL
WBC # BLD AUTO: 11.58 THOUSAND/UL (ref 4.31–10.16)

## 2024-08-19 PROCEDURE — 86901 BLOOD TYPING SEROLOGIC RH(D): CPT | Performed by: OBSTETRICS & GYNECOLOGY

## 2024-08-19 PROCEDURE — 86850 RBC ANTIBODY SCREEN: CPT | Performed by: OBSTETRICS & GYNECOLOGY

## 2024-08-19 PROCEDURE — 86870 RBC ANTIBODY IDENTIFICATION: CPT | Performed by: OBSTETRICS & GYNECOLOGY

## 2024-08-19 PROCEDURE — NC001 PR NO CHARGE: Performed by: OBSTETRICS & GYNECOLOGY

## 2024-08-19 PROCEDURE — 85025 COMPLETE CBC W/AUTO DIFF WBC: CPT | Performed by: OBSTETRICS & GYNECOLOGY

## 2024-08-19 PROCEDURE — 86780 TREPONEMA PALLIDUM: CPT | Performed by: OBSTETRICS & GYNECOLOGY

## 2024-08-19 PROCEDURE — 86900 BLOOD TYPING SEROLOGIC ABO: CPT | Performed by: OBSTETRICS & GYNECOLOGY

## 2024-08-19 RX ORDER — ONDANSETRON 2 MG/ML
4 INJECTION INTRAMUSCULAR; INTRAVENOUS EVERY 6 HOURS PRN
Status: DISCONTINUED | OUTPATIENT
Start: 2024-08-19 | End: 2024-08-20

## 2024-08-19 RX ORDER — BUTORPHANOL TARTRATE 1 MG/ML
1 INJECTION, SOLUTION INTRAMUSCULAR; INTRAVENOUS
Status: DISCONTINUED | OUTPATIENT
Start: 2024-08-19 | End: 2024-08-20

## 2024-08-19 RX ORDER — ACETAMINOPHEN 325 MG/1
650 TABLET ORAL EVERY 6 HOURS PRN
Status: DISCONTINUED | OUTPATIENT
Start: 2024-08-19 | End: 2024-08-20

## 2024-08-20 ENCOUNTER — ANESTHESIA EVENT (INPATIENT)
Dept: LABOR AND DELIVERY | Facility: HOSPITAL | Age: 29
End: 2024-08-20
Payer: COMMERCIAL

## 2024-08-20 ENCOUNTER — ANESTHESIA (INPATIENT)
Dept: LABOR AND DELIVERY | Facility: HOSPITAL | Age: 29
End: 2024-08-20
Payer: COMMERCIAL

## 2024-08-20 LAB
BASE EXCESS BLDCOV CALC-SCNC: -1.6 MMOL/L (ref 1–9)
BLOOD GROUP ANTIBODIES SERPL: NORMAL
HCO3 BLDCOV-SCNC: 24 MMOL/L (ref 12.2–28.6)
HOLD SPECIMEN: NORMAL
OXYHGB MFR BLDCOV: 53.7 %
PCO2 BLDCOV: 43.6 MM HG (ref 27–43)
PH BLDCOV: 7.36 [PH] (ref 7.19–7.49)
PO2 BLDCOV: 24.6 MM HG (ref 15–45)
SAO2 % BLDCOV: 12.8 ML/DL
TREPONEMA PALLIDUM IGG+IGM AB [PRESENCE] IN SERUM OR PLASMA BY IMMUNOASSAY: NORMAL

## 2024-08-20 PROCEDURE — 4A0HXCZ MEASUREMENT OF PRODUCTS OF CONCEPTION, CARDIAC RATE, EXTERNAL APPROACH: ICD-10-PCS | Performed by: OBSTETRICS & GYNECOLOGY

## 2024-08-20 PROCEDURE — 82805 BLOOD GASES W/O2 SATURATION: CPT | Performed by: OBSTETRICS & GYNECOLOGY

## 2024-08-20 PROCEDURE — 3E033VJ INTRODUCTION OF OTHER HORMONE INTO PERIPHERAL VEIN, PERCUTANEOUS APPROACH: ICD-10-PCS | Performed by: OBSTETRICS & GYNECOLOGY

## 2024-08-20 PROCEDURE — 10907ZC DRAINAGE OF AMNIOTIC FLUID, THERAPEUTIC FROM PRODUCTS OF CONCEPTION, VIA NATURAL OR ARTIFICIAL OPENING: ICD-10-PCS | Performed by: OBSTETRICS & GYNECOLOGY

## 2024-08-20 PROCEDURE — 59510 CESAREAN DELIVERY: CPT | Performed by: OBSTETRICS & GYNECOLOGY

## 2024-08-20 RX ORDER — FENTANYL CITRATE 50 UG/ML
INJECTION, SOLUTION INTRAMUSCULAR; INTRAVENOUS AS NEEDED
Status: DISCONTINUED | OUTPATIENT
Start: 2024-08-20 | End: 2024-08-20

## 2024-08-20 RX ORDER — SIMETHICONE 80 MG
80 TABLET,CHEWABLE ORAL 4 TIMES DAILY PRN
Status: DISCONTINUED | OUTPATIENT
Start: 2024-08-20 | End: 2024-08-23 | Stop reason: HOSPADM

## 2024-08-20 RX ORDER — ACETAMINOPHEN 325 MG/1
650 TABLET ORAL EVERY 6 HOURS SCHEDULED
Status: DISCONTINUED | OUTPATIENT
Start: 2024-08-21 | End: 2024-08-23 | Stop reason: HOSPADM

## 2024-08-20 RX ORDER — LIDOCAINE HYDROCHLORIDE 10 MG/ML
INJECTION, SOLUTION EPIDURAL; INFILTRATION; INTRACAUDAL; PERINEURAL AS NEEDED
Status: DISCONTINUED | OUTPATIENT
Start: 2024-08-20 | End: 2024-08-20

## 2024-08-20 RX ORDER — METOCLOPRAMIDE HYDROCHLORIDE 5 MG/ML
5 INJECTION INTRAMUSCULAR; INTRAVENOUS EVERY 6 HOURS PRN
Status: ACTIVE | OUTPATIENT
Start: 2024-08-20 | End: 2024-08-21

## 2024-08-20 RX ORDER — IBUPROFEN 600 MG/1
600 TABLET, FILM COATED ORAL EVERY 6 HOURS
Status: DISCONTINUED | OUTPATIENT
Start: 2024-08-22 | End: 2024-08-23 | Stop reason: HOSPADM

## 2024-08-20 RX ORDER — OXYTOCIN/RINGER'S LACTATE 30/500 ML
1-30 PLASTIC BAG, INJECTION (ML) INTRAVENOUS
Status: DISCONTINUED | OUTPATIENT
Start: 2024-08-20 | End: 2024-08-20

## 2024-08-20 RX ORDER — ONDANSETRON 2 MG/ML
4 INJECTION INTRAMUSCULAR; INTRAVENOUS EVERY 6 HOURS PRN
Status: ACTIVE | OUTPATIENT
Start: 2024-08-20 | End: 2024-08-21

## 2024-08-20 RX ORDER — OXYTOCIN/RINGER'S LACTATE 30/500 ML
125 PLASTIC BAG, INJECTION (ML) INTRAVENOUS ONCE
Status: COMPLETED | OUTPATIENT
Start: 2024-08-20 | End: 2024-08-20

## 2024-08-20 RX ORDER — BISACODYL 10 MG
10 SUPPOSITORY, RECTAL RECTAL DAILY PRN
Status: DISCONTINUED | OUTPATIENT
Start: 2024-08-20 | End: 2024-08-23 | Stop reason: HOSPADM

## 2024-08-20 RX ORDER — HYDROMORPHONE HCL IN WATER/PF 6 MG/30 ML
0.2 PATIENT CONTROLLED ANALGESIA SYRINGE INTRAVENOUS
Status: DISCONTINUED | OUTPATIENT
Start: 2024-08-20 | End: 2024-08-23 | Stop reason: HOSPADM

## 2024-08-20 RX ORDER — MAGNESIUM HYDROXIDE/ALUMINUM HYDROXICE/SIMETHICONE 120; 1200; 1200 MG/30ML; MG/30ML; MG/30ML
15 SUSPENSION ORAL EVERY 6 HOURS PRN
Status: DISCONTINUED | OUTPATIENT
Start: 2024-08-20 | End: 2024-08-23 | Stop reason: HOSPADM

## 2024-08-20 RX ORDER — NALBUPHINE HYDROCHLORIDE 10 MG/ML
5 INJECTION, SOLUTION INTRAMUSCULAR; INTRAVENOUS; SUBCUTANEOUS
Status: ACTIVE | OUTPATIENT
Start: 2024-08-20 | End: 2024-08-21

## 2024-08-20 RX ORDER — CLONIDINE 100 UG/ML
INJECTION, SOLUTION EPIDURAL AS NEEDED
Status: DISCONTINUED | OUTPATIENT
Start: 2024-08-20 | End: 2024-08-20

## 2024-08-20 RX ORDER — METHYLERGONOVINE MALEATE 0.2 MG/ML
INJECTION INTRAVENOUS AS NEEDED
Status: DISCONTINUED | OUTPATIENT
Start: 2024-08-20 | End: 2024-08-20

## 2024-08-20 RX ORDER — SODIUM CHLORIDE, SODIUM LACTATE, POTASSIUM CHLORIDE, CALCIUM CHLORIDE 600; 310; 30; 20 MG/100ML; MG/100ML; MG/100ML; MG/100ML
125 INJECTION, SOLUTION INTRAVENOUS CONTINUOUS
Status: DISCONTINUED | OUTPATIENT
Start: 2024-08-20 | End: 2024-08-23 | Stop reason: HOSPADM

## 2024-08-20 RX ORDER — BUPIVACAINE HYDROCHLORIDE 2.5 MG/ML
INJECTION, SOLUTION EPIDURAL; INFILTRATION; INTRACAUDAL AS NEEDED
Status: DISCONTINUED | OUTPATIENT
Start: 2024-08-20 | End: 2024-08-20

## 2024-08-20 RX ORDER — MORPHINE SULFATE 0.5 MG/ML
INJECTION, SOLUTION EPIDURAL; INTRATHECAL; INTRAVENOUS AS NEEDED
Status: DISCONTINUED | OUTPATIENT
Start: 2024-08-20 | End: 2024-08-20

## 2024-08-20 RX ORDER — CLINDAMYCIN PHOSPHATE 900 MG/50ML
900 INJECTION, SOLUTION INTRAVENOUS ONCE
Status: COMPLETED | OUTPATIENT
Start: 2024-08-20 | End: 2024-08-20

## 2024-08-20 RX ORDER — KETOROLAC TROMETHAMINE 30 MG/ML
30 INJECTION, SOLUTION INTRAMUSCULAR; INTRAVENOUS EVERY 6 HOURS
Status: COMPLETED | OUTPATIENT
Start: 2024-08-21 | End: 2024-08-21

## 2024-08-20 RX ORDER — DOCUSATE SODIUM 100 MG/1
100 CAPSULE, LIQUID FILLED ORAL 2 TIMES DAILY
Status: DISCONTINUED | OUTPATIENT
Start: 2024-08-20 | End: 2024-08-23 | Stop reason: HOSPADM

## 2024-08-20 RX ORDER — KETOROLAC TROMETHAMINE 30 MG/ML
INJECTION, SOLUTION INTRAMUSCULAR; INTRAVENOUS AS NEEDED
Status: DISCONTINUED | OUTPATIENT
Start: 2024-08-20 | End: 2024-08-20

## 2024-08-20 RX ORDER — ONDANSETRON 2 MG/ML
INJECTION INTRAMUSCULAR; INTRAVENOUS AS NEEDED
Status: DISCONTINUED | OUTPATIENT
Start: 2024-08-20 | End: 2024-08-20

## 2024-08-20 RX ORDER — DIPHENHYDRAMINE HYDROCHLORIDE 50 MG/ML
25 INJECTION INTRAMUSCULAR; INTRAVENOUS EVERY 6 HOURS PRN
Status: DISCONTINUED | OUTPATIENT
Start: 2024-08-20 | End: 2024-08-23 | Stop reason: HOSPADM

## 2024-08-20 RX ORDER — NALOXONE HYDROCHLORIDE 0.4 MG/ML
0.1 INJECTION, SOLUTION INTRAMUSCULAR; INTRAVENOUS; SUBCUTANEOUS
Status: ACTIVE | OUTPATIENT
Start: 2024-08-20 | End: 2024-08-21

## 2024-08-20 RX ORDER — ONDANSETRON 2 MG/ML
4 INJECTION INTRAMUSCULAR; INTRAVENOUS ONCE AS NEEDED
Status: DISCONTINUED | OUTPATIENT
Start: 2024-08-20 | End: 2024-08-23 | Stop reason: HOSPADM

## 2024-08-20 RX ORDER — OXYCODONE HYDROCHLORIDE 5 MG/1
5 TABLET ORAL EVERY 4 HOURS PRN
Status: DISCONTINUED | OUTPATIENT
Start: 2024-08-21 | End: 2024-08-23 | Stop reason: HOSPADM

## 2024-08-20 RX ORDER — DEXAMETHASONE SODIUM PHOSPHATE 10 MG/ML
INJECTION, SOLUTION INTRAMUSCULAR; INTRAVENOUS AS NEEDED
Status: DISCONTINUED | OUTPATIENT
Start: 2024-08-20 | End: 2024-08-20

## 2024-08-20 RX ORDER — HYDROMORPHONE HCL/PF 1 MG/ML
0.5 SYRINGE (ML) INJECTION EVERY 2 HOUR PRN
Status: DISCONTINUED | OUTPATIENT
Start: 2024-08-20 | End: 2024-08-23 | Stop reason: HOSPADM

## 2024-08-20 RX ORDER — FENTANYL CITRATE/PF 50 MCG/ML
50 SYRINGE (ML) INJECTION
Status: DISCONTINUED | OUTPATIENT
Start: 2024-08-20 | End: 2024-08-23 | Stop reason: HOSPADM

## 2024-08-20 RX ORDER — LIDOCAINE HCL/EPINEPHRINE/PF 2%-1:200K
VIAL (ML) INJECTION AS NEEDED
Status: DISCONTINUED | OUTPATIENT
Start: 2024-08-20 | End: 2024-08-20

## 2024-08-20 RX ORDER — LIDOCAINE HYDROCHLORIDE AND EPINEPHRINE 15; 5 MG/ML; UG/ML
INJECTION, SOLUTION EPIDURAL AS NEEDED
Status: DISCONTINUED | OUTPATIENT
Start: 2024-08-20 | End: 2024-08-20

## 2024-08-20 RX ADMIN — LIDOCAINE HYDROCHLORIDE AND EPINEPHRINE 5 ML: 20; 5 INJECTION, SOLUTION EPIDURAL; INFILTRATION; INTRACAUDAL; PERINEURAL at 20:43

## 2024-08-20 RX ADMIN — ONDANSETRON 4 MG: 2 INJECTION, SOLUTION INTRAMUSCULAR; INTRAVENOUS at 00:22

## 2024-08-20 RX ADMIN — LIDOCAINE HYDROCHLORIDE AND EPINEPHRINE 5 ML: 20; 5 INJECTION, SOLUTION EPIDURAL; INFILTRATION; INTRACAUDAL; PERINEURAL at 20:30

## 2024-08-20 RX ADMIN — ONDANSETRON 4 MG: 2 INJECTION, SOLUTION INTRAMUSCULAR; INTRAVENOUS at 17:42

## 2024-08-20 RX ADMIN — ROPIVACAINE HYDROCHLORIDE: 2 INJECTION, SOLUTION EPIDURAL; INFILTRATION at 15:15

## 2024-08-20 RX ADMIN — Medication 2 MILLI-UNITS/MIN: at 05:56

## 2024-08-20 RX ADMIN — FENTANYL CITRATE 100 MCG: 50 INJECTION, SOLUTION INTRAMUSCULAR; INTRAVENOUS at 20:30

## 2024-08-20 RX ADMIN — SODIUM CHLORIDE, SODIUM LACTATE, POTASSIUM CHLORIDE, AND CALCIUM CHLORIDE 125 ML/HR: .6; .31; .03; .02 INJECTION, SOLUTION INTRAVENOUS at 06:33

## 2024-08-20 RX ADMIN — ONDANSETRON 4 MG: 2 INJECTION INTRAMUSCULAR; INTRAVENOUS at 20:47

## 2024-08-20 RX ADMIN — Medication 500 MG: at 20:36

## 2024-08-20 RX ADMIN — GENTAMICIN SULFATE 320 MG: 40 INJECTION, SOLUTION INTRAMUSCULAR; INTRAVENOUS at 20:52

## 2024-08-20 RX ADMIN — SODIUM CHLORIDE, SODIUM LACTATE, POTASSIUM CHLORIDE, AND CALCIUM CHLORIDE 999 ML/HR: .6; .31; .03; .02 INJECTION, SOLUTION INTRAVENOUS at 14:33

## 2024-08-20 RX ADMIN — BUPIVACAINE HYDROCHLORIDE 4 ML: 2.5 INJECTION, SOLUTION EPIDURAL; INFILTRATION; INTRACAUDAL; PERINEURAL at 14:53

## 2024-08-20 RX ADMIN — MORPHINE SULFATE 2.5 MG: 0.5 INJECTION EPIDURAL; INTRATHECAL; INTRAVENOUS at 21:06

## 2024-08-20 RX ADMIN — LIDOCAINE HYDROCHLORIDE AND EPINEPHRINE 5 ML: 20; 5 INJECTION, SOLUTION EPIDURAL; INFILTRATION; INTRACAUDAL; PERINEURAL at 20:34

## 2024-08-20 RX ADMIN — LIDOCAINE HYDROCHLORIDE,EPINEPHRINE BITARTRATE 3 ML: 15; .005 INJECTION, SOLUTION EPIDURAL; INFILTRATION; INTRACAUDAL; PERINEURAL at 14:45

## 2024-08-20 RX ADMIN — BUPIVACAINE HYDROCHLORIDE 4 ML: 2.5 INJECTION, SOLUTION EPIDURAL; INFILTRATION; INTRACAUDAL; PERINEURAL at 14:55

## 2024-08-20 RX ADMIN — DEXAMETHASONE SODIUM PHOSPHATE 10 MG: 10 INJECTION, SOLUTION INTRAMUSCULAR; INTRAVENOUS at 20:49

## 2024-08-20 RX ADMIN — CLINDAMYCIN IN 5 PERCENT DEXTROSE 900 MG: 18 INJECTION, SOLUTION INTRAVENOUS at 20:40

## 2024-08-20 RX ADMIN — CLONIDINE HYDROCHLORIDE 100 MCG: 0.1 INJECTION, SOLUTION EPIDURAL at 18:06

## 2024-08-20 RX ADMIN — Medication 125 MILLI-UNITS/MIN: at 21:30

## 2024-08-20 RX ADMIN — LIDOCAINE HYDROCHLORIDE 4 ML: 10 INJECTION, SOLUTION EPIDURAL; INFILTRATION; INTRACAUDAL; PERINEURAL at 18:25

## 2024-08-20 RX ADMIN — SODIUM CHLORIDE, SODIUM LACTATE, POTASSIUM CHLORIDE, AND CALCIUM CHLORIDE 125 ML/HR: .6; .31; .03; .02 INJECTION, SOLUTION INTRAVENOUS at 20:05

## 2024-08-20 RX ADMIN — SODIUM CHLORIDE, SODIUM LACTATE, POTASSIUM CHLORIDE, AND CALCIUM CHLORIDE 125 ML/HR: .6; .31; .03; .02 INJECTION, SOLUTION INTRAVENOUS at 22:32

## 2024-08-20 RX ADMIN — METHYLERGONOVINE MALEATE 0.2 MG: 0.2 INJECTION INTRAVENOUS at 21:02

## 2024-08-20 RX ADMIN — PHENYLEPHRINE HYDROCHLORIDE 40 MCG/MIN: 10 INJECTION, SOLUTION INTRAVENOUS at 20:48

## 2024-08-20 RX ADMIN — LIDOCAINE HYDROCHLORIDE 5 ML: 10 INJECTION, SOLUTION EPIDURAL; INFILTRATION; INTRACAUDAL; PERINEURAL at 18:06

## 2024-08-20 RX ADMIN — KETOROLAC TROMETHAMINE 30 MG: 30 INJECTION, SOLUTION INTRAMUSCULAR; INTRAVENOUS at 21:17

## 2024-08-20 RX ADMIN — LIDOCAINE HYDROCHLORIDE,EPINEPHRINE BITARTRATE 3 ML: 15; .005 INJECTION, SOLUTION EPIDURAL; INFILTRATION; INTRACAUDAL; PERINEURAL at 14:53

## 2024-08-20 RX ADMIN — LIDOCAINE HYDROCHLORIDE AND EPINEPHRINE 5 ML: 20; 5 INJECTION, SOLUTION EPIDURAL; INFILTRATION; INTRACAUDAL; PERINEURAL at 20:39

## 2024-08-20 NOTE — OB LABOR/OXYTOCIN SAFETY PROGRESS
Labor Progress Note - Gay Alan 29 y.o. female MRN: [id number]    Unit/Bed#: -01 Encounter: [id number]    Dose (loraine-units/min) Oxytocin: 8 loraine-units/min  Contraction Frequency (minutes): 2-4  Contraction Intensity: Mild  Uterine Activity Characteristics: Occasional    Cervical Dilation: 4  Cervical Effacement: 50  Fetal Station: -2    FHT baseline: 140bpm  Variability: moderate  Accels: present  Decels: Absent  Category 1        Vital Signs:   Vitals:    08/20/24 0716   BP: 106/67   Pulse: 85   Resp:    Temp: 98.4 °F (36.9 °C)   SpO2:        Notes/comments:     Presented for IOL last night.  S/p carlin balloon for cervical ripening.  Now on pitocin.  Healthy.    Feeling ctx.  Asked if wants exam now - last 2 hours ago - pt wishes to wait a bit longer.  Will check in 1-2 hours.      Giovanna Kirk MD 8/20/2024 8:16 AM

## 2024-08-20 NOTE — ANESTHESIA PREPROCEDURE EVALUATION
Procedure:  LABOR ANALGESIA    Relevant Problems   GYN   (+) 39 weeks gestation of pregnancy         Lab Results   Component Value Date    HGB 13.1 08/19/2024     08/19/2024     Anesthesia Plan  ASA Score- 2     Anesthesia Type- epidural with ASA Monitors.         Additional Monitors:     Airway Plan:            Plan Factors-Exercise tolerance (METS): >4 METS.    Chart reviewed.   Existing labs reviewed. Patient summary reviewed.    Patient is not a current smoker.              Induction- intravenous.    Postoperative Plan-     Perioperative Resuscitation Plan - Level 1 - Full Code.       Informed Consent- Anesthetic plan and risks discussed with patient and spouse.  I personally reviewed this patient with the CRNA. Discussed and agreed on the Anesthesia Plan with the CRNA..

## 2024-08-20 NOTE — PLAN OF CARE
Problem: BIRTH - VAGINAL/ SECTION  Goal: Fetal and maternal status remain reassuring during the birth process  Description: INTERVENTIONS:  - Monitor vital signs  - Monitor fetal heart rate  - Monitor uterine activity  - Monitor labor progression (vaginal delivery)  - DVT prophylaxis  - Antibiotic prophylaxis  Outcome: Progressing  Goal: Emotionally satisfying birthing experience for mother/fetus  Description: Interventions:  - Assess, plan, implement and evaluate the nursing care given to the patient in labor  - Advocate the philosophy that each childbirth experience is a unique experience and support the family's chosen level of involvement and control during the labor process   - Actively participate in both the patient's and family's teaching of the birth process  - Consider cultural, Caodaism and age-specific factors and plan care for the patient in labor  Outcome: Progressing     Problem: PAIN - ADULT  Goal: Verbalizes/displays adequate comfort level or baseline comfort level  Description: Interventions:  - Encourage patient to monitor pain and request assistance  - Assess pain using appropriate pain scale  - Administer analgesics based on type and severity of pain and evaluate response  - Implement non-pharmacological measures as appropriate and evaluate response  - Consider cultural and social influences on pain and pain management  - Notify physician/advanced practitioner if interventions unsuccessful or patient reports new pain  Outcome: Progressing     Problem: INFECTION - ADULT  Goal: Absence or prevention of progression during hospitalization  Description: INTERVENTIONS:  - Assess and monitor for signs and symptoms of infection  - Monitor lab/diagnostic results  - Monitor all insertion sites, i.e. indwelling lines, tubes, and drains  - Monitor endotracheal if appropriate and nasal secretions for changes in amount and color  - [place] appropriate cooling/warming therapies per order  -  Administer medications as ordered  - Instruct and encourage patient and family to use good hand hygiene technique  - Identify and instruct in appropriate isolation precautions for identified infection/condition  Outcome: Progressing  Goal: Absence of fever/infection during neutropenic period  Description: INTERVENTIONS:  - Monitor WBC    Outcome: Progressing     Problem: SAFETY ADULT  Goal: Patient will remain free of falls  Description: INTERVENTIONS:  - Educate patient/family on patient safety including physical limitations  - Instruct patient to call for assistance with activity   - Consult OT/PT to assist with strengthening/mobility   - Keep Call bell within reach  - Keep bed low and locked with side rails adjusted as appropriate  - Keep care items and personal belongings within reach  - Initiate and maintain comfort rounds  - Make Fall Risk Sign visible to staff  - Apply yellow socks and bracelet for high fall risk patients  - Consider moving patient to room near nurses station  Outcome: Progressing  Goal: Maintain or return to baseline ADL function  Description: INTERVENTIONS:  -  Assess patient's ability to carry out ADLs; assess patient's baseline for ADL function and identify physical deficits which impact ability to perform ADLs (bathing, care of mouth/teeth, toileting, grooming, dressing, etc.)  - Assess/evaluate cause of self-care deficits   - Assess range of motion  - Assess patient's mobility; develop plan if impaired  - Assess patient's need for assistive devices and provide as appropriate  - Encourage maximum independence but intervene and supervise when necessary  - Involve family in performance of ADLs  - Assess for home care needs following discharge   - Consider OT consult to assist with ADL evaluation and planning for discharge  - Provide patient education as appropriate  Outcome: Progressing  Goal: Maintains/Returns to pre admission functional level  Description: INTERVENTIONS:  - Perform  AM-PAC 6 Click Basic Mobility/ Daily Activity assessment daily.  - Set and communicate daily mobility goal to care team and patient/family/caregiver.   - Collaborate with rehabilitation services on mobility goals if consulted  - Out of bed for toileting  - Record patient progress and toleration of activity level   Outcome: Progressing     Problem: Knowledge Deficit  Goal: Patient/family/caregiver demonstrates understanding of disease process, treatment plan, medications, and discharge instructions  Description: Complete learning assessment and assess knowledge base.  Interventions:  - Provide teaching at level of understanding  - Provide teaching via preferred learning methods  Outcome: Progressing     Problem: DISCHARGE PLANNING  Goal: Discharge to home or other facility with appropriate resources  Description: INTERVENTIONS:  - Identify barriers to discharge w/patient and caregiver  - Arrange for needed discharge resources and transportation as appropriate  - Identify discharge learning needs (meds, wound care, etc.)  - Arrange for interpretive services to assist at discharge as needed  - Refer to Case Management Department for coordinating discharge planning if the patient needs post-hospital services based on physician/advanced practitioner order or complex needs related to functional status, cognitive ability, or social support system  Outcome: Progressing

## 2024-08-20 NOTE — PLAN OF CARE
Problem: BIRTH - VAGINAL/ SECTION  Goal: Fetal and maternal status remain reassuring during the birth process  Description: INTERVENTIONS:  - Monitor vital signs  - Monitor fetal heart rate  - Monitor uterine activity  - Monitor labor progression (vaginal delivery)  - DVT prophylaxis  - Antibiotic prophylaxis  2024 by Halima Whiteside RN  Outcome: Progressing  2024 by Halima Whiteside RN  Outcome: Progressing  Goal: Emotionally satisfying birthing experience for mother/fetus  Description: Interventions:  - Assess, plan, implement and evaluate the nursing care given to the patient in labor  - Advocate the philosophy that each childbirth experience is a unique experience and support the family's chosen level of involvement and control during the labor process   - Actively participate in both the patient's and family's teaching of the birth process  - Consider cultural, Synagogue and age-specific factors and plan care for the patient in labor  2024 by Halima Whiteside RN  Outcome: Progressing  2024 by Halima Whiteside RN  Outcome: Progressing     Problem: PAIN - ADULT  Goal: Verbalizes/displays adequate comfort level or baseline comfort level  Description: Interventions:  - Encourage patient to monitor pain and request assistance  - Assess pain using appropriate pain scale  - Administer analgesics based on type and severity of pain and evaluate response  - Implement non-pharmacological measures as appropriate and evaluate response  - Consider cultural and social influences on pain and pain management  - Notify physician/advanced practitioner if interventions unsuccessful or patient reports new pain  2024 by Halima Whiteside RN  Outcome: Progressing  2024 by Halima Whiteside RN  Outcome: Progressing     Problem: INFECTION - ADULT  Goal: Absence or prevention of progression during hospitalization  Description: INTERVENTIONS:  - Assess and  monitor for signs and symptoms of infection  - Monitor lab/diagnostic results  - Monitor all insertion sites, i.e. indwelling lines, tubes, and drains  - Monitor endotracheal if appropriate and nasal secretions for changes in amount and color  - [place] appropriate cooling/warming therapies per order  - Administer medications as ordered  - Instruct and encourage patient and family to use good hand hygiene technique  - Identify and instruct in appropriate isolation precautions for identified infection/condition  8/20/2024 1919 by Halima Whiteside RN  Outcome: Progressing  8/20/2024 1918 by Halima Whiteside RN  Outcome: Progressing  Goal: Absence of fever/infection during neutropenic period  Description: INTERVENTIONS:  - Monitor WBC    8/20/2024 1919 by Halima Whiteside RN  Outcome: Progressing  8/20/2024 1918 by Halima Whiteside RN  Outcome: Progressing     Problem: SAFETY ADULT  Goal: Patient will remain free of falls  Description: INTERVENTIONS:  - Educate patient/family on patient safety including physical limitations  - Instruct patient to call for assistance with activity   - Consult OT/PT to assist with strengthening/mobility   - Keep Call bell within reach  - Keep bed low and locked with side rails adjusted as appropriate  - Keep care items and personal belongings within reach  - Initiate and maintain comfort rounds  - Make Fall Risk Sign visible to staff    - Apply yellow socks and bracelet for high fall risk patients  - Consider moving patient to room near nurses station  8/20/2024 1919 by Halima Whiteside RN  Outcome: Progressing  8/20/2024 1918 by Halima Whiteside RN  Outcome: Progressing  Goal: Maintain or return to baseline ADL function  Description: INTERVENTIONS:  -  Assess patient's ability to carry out ADLs; assess patient's baseline for ADL function and identify physical deficits which impact ability to perform ADLs (bathing, care of mouth/teeth, toileting, grooming, dressing,  etc.)  - Assess/evaluate cause of self-care deficits   - Assess range of motion  - Assess patient's mobility; develop plan if impaired  - Assess patient's need for assistive devices and provide as appropriate  - Encourage maximum independence but intervene and supervise when necessary  - Involve family in performance of ADLs  - Assess for home care needs following discharge   - Consider OT consult to assist with ADL evaluation and planning for discharge  - Provide patient education as appropriate  8/20/2024 1919 by Halima Whiteside RN  Outcome: Progressing  8/20/2024 1918 by Halima Whiteside RN  Outcome: Progressing  Goal: Maintains/Returns to pre admission functional level  Description: INTERVENTIONS:  - Perform AM-PAC 6 Click Basic Mobility/ Daily Activity assessment daily.  - Set and communicate daily mobility goal to care team and patient/family/caregiver.   - Collaborate with rehabilitation services on mobility goals if consulted    - Out of bed for toileting  - Record patient progress and toleration of activity level   8/20/2024 1919 by Halima Whiteside RN  Outcome: Progressing  8/20/2024 1918 by Halima Whiteside RN  Outcome: Progressing     Problem: Knowledge Deficit  Goal: Patient/family/caregiver demonstrates understanding of disease process, treatment plan, medications, and discharge instructions  Description: Complete learning assessment and assess knowledge base.  Interventions:  - Provide teaching at level of understanding  - Provide teaching via preferred learning methods  8/20/2024 1919 by Halima Whiteside RN  Outcome: Progressing  8/20/2024 1918 by Halima Whiteside RN  Outcome: Progressing     Problem: DISCHARGE PLANNING  Goal: Discharge to home or other facility with appropriate resources  Description: INTERVENTIONS:  - Identify barriers to discharge w/patient and caregiver  - Arrange for needed discharge resources and transportation as appropriate  - Identify discharge learning needs  (meds, wound care, etc.)  - Arrange for interpretive services to assist at discharge as needed  - Refer to Case Management Department for coordinating discharge planning if the patient needs post-hospital services based on physician/advanced practitioner order or complex needs related to functional status, cognitive ability, or social support system  8/20/2024 1919 by Halima Whiteside RN  Outcome: Progressing  8/20/2024 1918 by Halima Whiteside RN  Outcome: Progressing

## 2024-08-20 NOTE — H&P
"H&P Exam - Obstetrics   Gay Alan 29 y.o. female MRN: [id number]  Unit/Bed#: -01 Encounter: [id number]    <2 Midnights    INPATIENT     History of Present Illness   Chief Complaint: Elective IOL    HPI:  Gay Alan is a 29 y.o.  female with an ALFONSO of 2024, by Last Menstrual Period at 40w0d weeks gestation who is being admitted for elective IOL.  Her current obstetrical history is significant for Rh negative.  Contractions: occasional cramping.  Leakage of fluid: None.  Bleeding: None.  Fetal movement: present.    Pregnancy complications: none.    Review of Systems   Constitutional:  Negative for chills and fever.   Respiratory:  Negative for cough and wheezing.    Cardiovascular:  Negative for leg swelling.   Gastrointestinal:  Negative for nausea and vomiting.       Historical Information   OB History    Para Term  AB Living   1 0 0 0 0 0   SAB IAB Ectopic Multiple Live Births   0 0 0 0 0      # Outcome Date GA Lbr Brayden/2nd Weight Sex Type Anes PTL Lv   1 Current                Past Surgical History:   Procedure Laterality Date    WISDOM TOOTH EXTRACTION       Social History   Social History     Substance and Sexual Activity   Alcohol Use Not Currently    Alcohol/week: 1.0 standard drink of alcohol    Types: 1 Glasses of wine per week    Comment: social      Social History     Substance and Sexual Activity   Drug Use Never     Social History     Tobacco Use   Smoking Status Never    Passive exposure: Never   Smokeless Tobacco Never     Family History: non-contributory    Meds/Allergies   {  Medications Prior to Admission:     Prenatal MV-Min-Fe Fum-FA-DHA (PRENATAL 1 PO)  Allergies   Allergen Reactions    Amoxicillin Hives    Keflex [Cephalexin] Hives       Objective   Vitals: Blood pressure 115/80, pulse 99, temperature 98.2 °F (36.8 °C), temperature source Oral, resp. rate 18, height 5' 4\" (1.626 m), weight 77.6 kg (171 lb), last menstrual period " "2023, SpO2 96%.Body mass index is 29.35 kg/m².    Invasive Devices       None                 Const: Alert, awake, No distress  Heart: regular rate  Lungs- normal effort  Abd- gravid, NT  Extremities: trace veena    Cervix- soft  no lesions or cervical motion tenderness, dilation FT, effacement 30, station -3, and position: posterior    Carlin balloon inserted without difficulty filled with 80 ml    Fetal heart rate  moderate  Baseline: 150 bpm  Prenatal Labs: I have personally reviewed pertinent reports.  , Blood Type:   Lab Results   Component Value Date/Time    ABO Grouping A 2024 02:21 PM     , D (Rh type):   Lab Results   Component Value Date/Time    Rh Type Negative 2024 02:21 PM     , Antibody Screen: No results found for: \"ANTIBODYSCR\" , HCT/HGB:   Lab Results   Component Value Date/Time    Hematocrit 38.4 2024 08:47 PM    Hemoglobin 13.1 2024 08:47 PM      , Platelets:   Lab Results   Component Value Date/Time    Platelets 192 2024 08:47 PM      , 1 hour Glucola:   Lab Results   Component Value Date/Time    Glucose 114 2024 02:21 PM   , Rubella: No results found for: \"RUBELLAIGGQT\"     , Hep B:   Lab Results   Component Value Date/Time    Hepatitis B Surface Ag negative 2024 12:00 AM     , HIV:   Lab Results   Component Value Date/Time    HIV-1/HIV-2 AB Non-Reactive 2024 12:00 AM     , Chlamydia:   Lab Results   Component Value Date/Time    External Chlamydia Screen negative 2024 12:00 AM     , Gonorrhea: No results found for: \"LABNGO\"  , Group B Strep:    Lab Results   Component Value Date/Time    Strep Grp B SYLVIA Negative 2024 11:15 AM          Imaging, EKG, Pathology, and Other Studies: I have personally reviewed pertinent reports.          Assessment:  1)  at 40 wks for elective induction  2) Rh negative  Plan:  Carlin balloon inserted for ripening  Pitocin when carlin out  Anticipate     Federica Short,   24  9:22 PM      "

## 2024-08-20 NOTE — PLAN OF CARE
Problem: BIRTH - VAGINAL/ SECTION  Goal: Fetal and maternal status remain reassuring during the birth process  Description: INTERVENTIONS:  - Monitor vital signs  - Monitor fetal heart rate  - Monitor uterine activity  - Monitor labor progression (vaginal delivery)  - DVT prophylaxis  - Antibiotic prophylaxis  Outcome: Progressing  Goal: Emotionally satisfying birthing experience for mother/fetus  Description: Interventions:  - Assess, plan, implement and evaluate the nursing care given to the patient in labor  - Advocate the philosophy that each childbirth experience is a unique experience and support the family's chosen level of involvement and control during the labor process   - Actively participate in both the patient's and family's teaching of the birth process  - Consider cultural, Hinduism and age-specific factors and plan care for the patient in labor  Outcome: Progressing     Problem: PAIN - ADULT  Goal: Verbalizes/displays adequate comfort level or baseline comfort level  Description: Interventions:  - Encourage patient to monitor pain and request assistance  - Assess pain using appropriate pain scale  - Administer analgesics based on type and severity of pain and evaluate response  - Implement non-pharmacological measures as appropriate and evaluate response  - Consider cultural and social influences on pain and pain management  - Notify physician/advanced practitioner if interventions unsuccessful or patient reports new pain  Outcome: Progressing     Problem: INFECTION - ADULT  Goal: Absence or prevention of progression during hospitalization  Description: INTERVENTIONS:  - Assess and monitor for signs and symptoms of infection  - Monitor lab/diagnostic results  - Monitor all insertion sites, i.e. indwelling lines, tubes, and drains  - Monitor endotracheal if appropriate and nasal secretions for changes in amount and color  - [place] appropriate cooling/warming therapies per order  -  Administer medications as ordered  - Instruct and encourage patient and family to use good hand hygiene technique  - Identify and instruct in appropriate isolation precautions for identified infection/condition  Outcome: Progressing  Goal: Absence of fever/infection during neutropenic period  Description: INTERVENTIONS:  - Monitor WBC    Outcome: Progressing     Problem: SAFETY ADULT  Goal: Patient will remain free of falls  Description: INTERVENTIONS:  - Educate patient/family on patient safety including physical limitations  - Instruct patient to call for assistance with activity   - Consult OT/PT to assist with strengthening/mobility   - Keep Call bell within reach  - Keep bed low and locked with side rails adjusted as appropriate  - Keep care items and personal belongings within reach  - Initiate and maintain comfort rounds  - Make Fall Risk Sign visible to staff  - Apply yellow socks and bracelet for high fall risk patients  - Consider moving patient to room near nurses station  Outcome: Progressing  Goal: Maintain or return to baseline ADL function  Description: INTERVENTIONS:  -  Assess patient's ability to carry out ADLs; assess patient's baseline for ADL function and identify physical deficits which impact ability to perform ADLs (bathing, care of mouth/teeth, toileting, grooming, dressing, etc.)  - Assess/evaluate cause of self-care deficits   - Assess range of motion  - Assess patient's mobility; develop plan if impaired  - Assess patient's need for assistive devices and provide as appropriate  - Encourage maximum independence but intervene and supervise when necessary  - Involve family in performance of ADLs  - Assess for home care needs following discharge   - Consider OT consult to assist with ADL evaluation and planning for discharge  - Provide patient education as appropriate  Outcome: Progressing  Goal: Maintains/Returns to pre admission functional level  Description: INTERVENTIONS:  - Perform  AM-PAC 6 Click Basic Mobility/ Daily Activity assessment daily.  - Set and communicate daily mobility goal to care team and patient/family/caregiver.   - Collaborate with rehabilitation services on mobility goals if consulted  - Out of bed for toileting  - Record patient progress and toleration of activity level   Outcome: Progressing     Problem: Knowledge Deficit  Goal: Patient/family/caregiver demonstrates understanding of disease process, treatment plan, medications, and discharge instructions  Description: Complete learning assessment and assess knowledge base.  Interventions:  - Provide teaching at level of understanding  - Provide teaching via preferred learning methods  Outcome: Progressing     Problem: DISCHARGE PLANNING  Goal: Discharge to home or other facility with appropriate resources  Description: INTERVENTIONS:  - Identify barriers to discharge w/patient and caregiver  - Arrange for needed discharge resources and transportation as appropriate  - Identify discharge learning needs (meds, wound care, etc.)  - Arrange for interpretive services to assist at discharge as needed  - Refer to Case Management Department for coordinating discharge planning if the patient needs post-hospital services based on physician/advanced practitioner order or complex needs related to functional status, cognitive ability, or social support system  Outcome: Progressing

## 2024-08-20 NOTE — OB LABOR/OXYTOCIN SAFETY PROGRESS
Labor Progress Note - Gay Alan 29 y.o. female MRN: [id number]    Unit/Bed#: -01 Encounter: [id number]       Contraction Frequency (minutes): 3-10  Contraction Intensity: Mild  Uterine Activity Characteristics: Irregular  Cervical Dilation: 4        Cervical Effacement: 50  Fetal Station: -2  Baseline Rate (FHR): 135 bpm  Fetal Heart Rate (FHT): 150 BPM  FHR Category: 1               Vital Signs:   Vitals:    08/19/24 2300   BP: 110/71   Pulse: 85   Resp: 18   Temp: 98.4 °F (36.9 °C)   SpO2:        Notes/comments:   Li balloon out  Will allow to eat and shower  Start pitocin at 0600      Federica Short DO 8/20/2024 4:11 AM

## 2024-08-20 NOTE — ANESTHESIA PROCEDURE NOTES
Epidural Block    Patient location during procedure: OB/L&D  Start time: 8/20/2024 2:51 PM  Reason for block: procedure for pain and at surgeon's request  Staffing  Performed by: Piedad Son MD  Authorized by: Piedad Son MD    Preanesthetic Checklist  Completed: patient identified, IV checked, site marked, risks and benefits discussed, surgical consent, monitors and equipment checked, pre-op evaluation and timeout performed  Epidural  Patient position: sitting  Prep: ChloraPrep and site prepped and draped  Sedation Level: no sedation  Patient monitoring: cardiac monitor, continuous pulse oximetry, heart rate and frequent blood pressure checks  Approach: midline  Location: lumbar, L3-4  Injection technique: CHRIS saline  Needle  Needle type: Tuohy   Needle gauge: 17 G  Needle insertion depth: 6 cm  Catheter type: multi-orifice and spring wound  Catheter size: 19 G  Catheter at skin depth: 11 cm  Catheter securement method: stabilization device, clear occlusive dressing and tape  Test dose: negative  Assessment  Sensory level: T10  Number of attempts: 2negative aspiration for CSF, negative aspiration for heme and no paresthesia on injection  patient tolerated the procedure well with no immediate complications  Additional Notes  Easy placement but catheter slightly difficult to thread, unable to push meds easily, local leaking back out through insertion site so removed, replaced same level, normal thread and able to push meds easily.  Pt tolerated well

## 2024-08-20 NOTE — OB LABOR/OXYTOCIN SAFETY PROGRESS
Labor Progress Note - Gay Alan 29 y.o. female MRN: [id number]    Unit/Bed#: -01 Encounter: [id number]    Dose (loraine-units/min) Oxytocin: 14 loraine-units/min  Contraction Frequency (minutes): 2-4  Contraction Intensity: Moderate  Uterine Activity Characteristics: Regular    Cervical Dilation: 4  Cervical Effacement: 80  Fetal Station: -2          Vital Signs:   Vitals:    08/20/24 1559   BP: 91/53   Pulse: 89   Resp:    Temp:    SpO2: 100%       Notes/comments:     Patient received epidural and now comfortable with contractions.    Cervix unchanged.  Will continue pitocin augmentation.  If no change in a couple hours will place IUPC to help titrate pitocin.    RN asked me to place carlin cath after she had difficulty.  Placed without issue.  Clear yellow urine.    Giovanna Kirk MD 8/20/2024 4:25 PM

## 2024-08-20 NOTE — QUICK NOTE
Anesthesia evaluating patient for left hip pain.  Bolus given.  Cervical exam repeated and unchanged.  Still 4cm.    Anesthesia considering replacing epidural if no improvement with boluses.  Given no change once comfortable will place [facility] to help titrate pitocin.    Giovanna Kirk MD

## 2024-08-20 NOTE — OB LABOR/OXYTOCIN SAFETY PROGRESS
Labor Progress Note - Gay Alan 29 y.o. female MRN: [id number]    Unit/Bed#: -01 Encounter: [id number]    Dose (loraine-units/min) Oxytocin: 12 loraine-units/min  Contraction Frequency (minutes): 3-4  Contraction Intensity: Mild  Uterine Activity Characteristics: Irritability    Cervical Dilation: 4  Cervical Effacement: 50  Fetal Station: -3    FHT baseline: 140bpm  Variability: moderate  Accels: present  Decels: Absent  Category 1        Vital Signs:   Vitals:    08/20/24 1200   BP: 110/74   Pulse:    Resp:    Temp:    SpO2:        Notes/comments:     AROM'd for clear fluid.  Epidural prn.      Giovanna Kirk MD 8/20/2024 1:00 PM

## 2024-08-20 NOTE — ANESTHESIA PROCEDURE NOTES
Epidural Block    Patient location during procedure: OB/L&D  Start time: 8/20/2024 6:49 PM  Reason for block: procedure for pain and at surgeon's request  Staffing  Performed by: Piedad Son MD  Authorized by: Piedad Son MD    Preanesthetic Checklist  Completed: patient identified, IV checked, site marked, risks and benefits discussed, surgical consent, monitors and equipment checked, pre-op evaluation and timeout performed  Epidural  Patient position: sitting  Prep: ChloraPrep  Sedation Level: no sedation  Patient monitoring: frequent blood pressure checks, continuous pulse oximetry and heart rate  Approach: midline  Location: lumbar, L3-4  Injection technique: CHRIS saline  Needle  Needle type: Tuohy   Needle gauge: 17 G  Needle insertion depth: 6 cm  Catheter type: multi-orifice and spring wound  Catheter size: 19 G  Catheter at skin depth: 11 cm  Catheter securement method: stabilization device, clear occlusive dressing and tape  Test dose: negative  Assessment  Sensory level: T10  Number of attempts: 1negative aspiration for CSF, negative aspiration for heme and no paresthesia on injection  patient tolerated the procedure well with no immediate complications  Additional Notes  Prior epidural removed with pt sitting up; sterile prep and drape.  Prior to replacement pt noted pain completely resolved in this position.  Epidural replaced same level without incident.  Suspect pain from baby's postioning/head.  After replacement pt laid down and again feeling similar discomfort so repositioned upright in position similar to epidural placement.  Infusion restarted, no additional bolus given

## 2024-08-20 NOTE — OB LABOR/OXYTOCIN SAFETY PROGRESS
Labor Progress Note - Gay Alan 29 y.o. female MRN: [id number]    Unit/Bed#: -01 Encounter: [id number]    Dose (loraine-units/min) Oxytocin: 10 loraine-units/min  Contraction Frequency (minutes): 3-4  Contraction Intensity: Mild  Uterine Activity Characteristics: Regular    Cervical Dilation: 4  Cervical Effacement: 50  Fetal Station: -3    FHT baseline: 150bpm  Variability: moderate  Accels: present  Decels: Absent  Category 1        Vital Signs:   Vitals:    08/20/24 0810   BP: 107/58   Pulse: 81   Resp:    Temp:    SpO2:        Notes/comments:     Cervix unchanged.  A bit too high for AROM.  Will get u/s to confirm vertex (per pt not done on admission).  Continue pitocin.  Epidural prn.      Giovanna Kirk MD 8/20/2024 9:44 AM

## 2024-08-21 LAB
ABO GROUP BLD: NORMAL
BLD GP AB SCN SERPL QL: NEGATIVE
ERYTHROCYTE [DISTWIDTH] IN BLOOD BY AUTOMATED COUNT: 14.2 % (ref 11.6–15.1)
FETAL CELL SCN BLD QL ROSETTE: NEGATIVE
HCT VFR BLD AUTO: 34.8 % (ref 34.8–46.1)
HGB BLD-MCNC: 11.9 G/DL (ref 11.5–15.4)
MCH RBC QN AUTO: 33.4 PG (ref 26.8–34.3)
MCHC RBC AUTO-ENTMCNC: 34.2 G/DL (ref 31.4–37.4)
MCV RBC AUTO: 98 FL (ref 82–98)
PLATELET # BLD AUTO: 162 THOUSANDS/UL (ref 149–390)
PMV BLD AUTO: 10.9 FL (ref 8.9–12.7)
RBC # BLD AUTO: 3.56 MILLION/UL (ref 3.81–5.12)
RH BLD: NEGATIVE
WBC # BLD AUTO: 20.63 THOUSAND/UL (ref 4.31–10.16)

## 2024-08-21 PROCEDURE — 99024 POSTOP FOLLOW-UP VISIT: CPT | Performed by: OBSTETRICS & GYNECOLOGY

## 2024-08-21 PROCEDURE — 85027 COMPLETE CBC AUTOMATED: CPT | Performed by: OBSTETRICS & GYNECOLOGY

## 2024-08-21 PROCEDURE — 86901 BLOOD TYPING SEROLOGIC RH(D): CPT | Performed by: OBSTETRICS & GYNECOLOGY

## 2024-08-21 PROCEDURE — 85461 HEMOGLOBIN FETAL: CPT | Performed by: OBSTETRICS & GYNECOLOGY

## 2024-08-21 PROCEDURE — 86850 RBC ANTIBODY SCREEN: CPT | Performed by: OBSTETRICS & GYNECOLOGY

## 2024-08-21 PROCEDURE — 86900 BLOOD TYPING SEROLOGIC ABO: CPT | Performed by: OBSTETRICS & GYNECOLOGY

## 2024-08-21 RX ORDER — OXYTOCIN/RINGER'S LACTATE 30/500 ML
PLASTIC BAG, INJECTION (ML) INTRAVENOUS
Status: COMPLETED
Start: 2024-08-21 | End: 2024-08-21

## 2024-08-21 RX ADMIN — KETOROLAC TROMETHAMINE 30 MG: 30 INJECTION, SOLUTION INTRAMUSCULAR; INTRAVENOUS at 22:37

## 2024-08-21 RX ADMIN — SIMETHICONE 80 MG: 80 TABLET, CHEWABLE ORAL at 23:26

## 2024-08-21 RX ADMIN — KETOROLAC TROMETHAMINE 30 MG: 30 INJECTION, SOLUTION INTRAMUSCULAR; INTRAVENOUS at 02:32

## 2024-08-21 RX ADMIN — KETOROLAC TROMETHAMINE 30 MG: 30 INJECTION, SOLUTION INTRAMUSCULAR; INTRAVENOUS at 08:35

## 2024-08-21 RX ADMIN — DOCUSATE SODIUM 100 MG: 100 CAPSULE, LIQUID FILLED ORAL at 18:14

## 2024-08-21 RX ADMIN — KETOROLAC TROMETHAMINE 30 MG: 30 INJECTION, SOLUTION INTRAMUSCULAR; INTRAVENOUS at 15:50

## 2024-08-21 RX ADMIN — Medication 30 UNITS: at 02:24

## 2024-08-21 RX ADMIN — DOCUSATE SODIUM 100 MG: 100 CAPSULE, LIQUID FILLED ORAL at 08:35

## 2024-08-21 RX ADMIN — ACETAMINOPHEN 650 MG: 325 TABLET, FILM COATED ORAL at 05:58

## 2024-08-21 RX ADMIN — ACETAMINOPHEN 650 MG: 325 TABLET, FILM COATED ORAL at 18:14

## 2024-08-21 RX ADMIN — RHO(D) IMMUNE GLOBULIN (HUMAN) 300 MCG: 1500 SOLUTION INTRAMUSCULAR at 12:02

## 2024-08-21 RX ADMIN — ACETAMINOPHEN 650 MG: 325 TABLET, FILM COATED ORAL at 12:04

## 2024-08-21 RX ADMIN — ACETAMINOPHEN 650 MG: 325 TABLET, FILM COATED ORAL at 23:26

## 2024-08-21 NOTE — LACTATION NOTE
"This note was copied from a baby's chart.  Met with parents to discuss feeding plan. Mother is planning on breastfeeding and has been doing well. Baby is latching on right side and not on the left side.      The Ready, Set, Baby Booklet was discussed. Discussed importance of skin to skin to help baby awaken for breastfeeding, to help with milk production as well as stabilize temperature, blood sugars, decrease pain, promote relaxation, and calm the baby as well as for bonding that father may do as well. Discussed tummy size progression as milk production increases to meet the nutritional/growing needs of the baby and risks associated with introducing early supplementation that is not medically indicated. Discussed alternative feeding methods as a manner to provide baby with additional colostrum/breast milk if baby is sleepy and/or unable to breastfeed directly to help protect the milk supply and preserve latching abilities at the breast. Mother was encouraged to hand express after feedings to provide \"dessert\" and when sleepy to hand express to provide \"snacks\" which could help baby to awaken for a feeding.    Discussed “Second Night Syndrome” explaining how baby’s cluster feeds to meet growing needs. Growth spurts periods were discussed within the first year and how cluster feeding helps boost milk supply.    Explained feeding cues and fullness cues as well as importance of obtaining a deep latch for effective milk removal and proper positioning (tummy to tummy, at level, nose to nipple, bring chin to breast first and bringing baby to breast) with ear, shoulder, and hip alignment. Demonstrated how to hold, compress and perform hand expression. Mother was able to perform, baby received a few drops, attempted to latch on both side. Using a tea cup hold helped achieve a deeper latch. Baby uninterested during encounter. Received about 7 drops via finger.    Addressed breast pump needs and mother has double breast pump " and a manual hand pump for home use.    Manual breast pump with syringes were placed by sink for stimulation in addition to hand expression to protect supply if baby does not latch to provide expressed breast milk.    Parents were made aware of how to communicate with lactation and encouraged to reach out for continued support and/or questions that arise.

## 2024-08-21 NOTE — OB LABOR/OXYTOCIN SAFETY PROGRESS
Labor Progress Note - Gay Alan 29 y.o. female MRN: [id number]    Unit/Bed#: -01 Encounter: [id number]    Dose (loraine-units/min) Oxytocin: 0 loraine-units/min  Contraction Frequency (minutes): 2-3  Contraction Intensity: Moderate/Strong  Uterine Activity Characteristics: Regular    Cervical Dilation: 4  Cervical Effacement: 80  Fetal Station: -2    Baseline Rate (FHR): 140 bpm  Fetal Heart Rate (FHT): 150 BPM  FHR Category: 1               Vital Signs:   Vitals:    24   BP: 108/65   Pulse: 94   Resp:    Temp:    SpO2: 96%       Notes/comments:     Epidural replaced by anesthesia over 1 hour ago.  Patient without improvement in pain.  She asked for cervical exam again and unchanged.    She and  feel a  may be necessary for delivery, as she cannot continue with the current level of pain this remote from delivery.  Reviewed course of induction - since carlin balloon out patient has been 4cm dilated.  She has had some effacement of cervix over the day but no dilation.  She has been on pitocin for > 12 hours and titrated to 18mU/min with contractions every 2-3 minutes.  She was AROM'd around noon.    Anesthesia feels that there is no further she can do to help with pain control.  She feels the localized pain she has is from position of head in pelvis.    Reviewed risks of  including increased bleeding, injury to internal organs not limited to bladder and bowels, injury to nerves and blood vessels, increase risk infection postpartum.  Discussed that would not necessarily need  for further deliveries but repeat c-sections do increase risk of injury during subsequent  and abnormal placentation.      All questions answered and patient and  had time to discuss privately after our discussion.  They express understanding of risk and wish to proceed with  for delivery given not in active labor after over 12 hours of induction and inability to  control pain.    Pitocin stopped.  Anesthesia and charge nurse aware.    Giovanna Kirk MD 8/20/2024 8:12 PM

## 2024-08-21 NOTE — PROGRESS NOTES
Post- Progress note    Patient is post-op day 1 from a Primary Low Transverse  delivery.     Pain: controlled  Tolerating Oral Intake: yes  Voiding: carlin out, awaiting void.   Flatus: no  Bowel Movement: no  Ambulating: no  Breastfeeding: Breastfeeding  Chest Pain: no  Shortness of Breath: no  Leg Pain/Discomfort: no  Lochia: normal    Objective:   Vitals:    24 0830   BP: 109/73   Pulse: 59   Resp: 16   Temp: 98.1 °F (36.7 °C)   SpO2: 95%       Intake/Output Summary (Last 24 hours) at 2024 1019  Last data filed at 2024 0615  Gross per 24 hour   Intake 700 ml   Output 1212 ml   Net -512 ml       Physical Exam:  General: in no apparent distress, well developed and well nourished, and non-toxic  Lungs:  normal effort  Abdomen: abdomen is soft without significant tenderness, masses, organomegaly or guarding  Fundus: Firm and non-tender, 1 below the umbilicus  Incision: covered, dressing dry  Lower extremeties: nontender    Labs/Tests:   Lab Results   Component Value Date    WBC 20.63 (H) 2024    HGB 11.9 2024    HCT 34.8 2024    MCV 98 2024     2024     Assessment and Plan:  29 y.o. year-old , postoperative day 1 status-post Primary Low Transverse  delivery. Doing well.     Continue routine postpartum care  Awaiting void and flatus  Encourage ambulation  Advance diet as tolerated    Honey Chacon MD

## 2024-08-21 NOTE — PLAN OF CARE
Problem: BIRTH - VAGINAL/ SECTION  Goal: Fetal and maternal status remain reassuring during the birth process  Description: INTERVENTIONS:  - Monitor vital signs  - Monitor fetal heart rate  - Monitor uterine activity  - Monitor labor progression (vaginal delivery)  - DVT prophylaxis  - Antibiotic prophylaxis  Outcome: Completed  Goal: Emotionally satisfying birthing experience for mother/fetus  Description: Interventions:  - Assess, plan, implement and evaluate the nursing care given to the patient in labor  - Advocate the philosophy that each childbirth experience is a unique experience and support the family's chosen level of involvement and control during the labor process   - Actively participate in both the patient's and family's teaching of the birth process  - Consider cultural, Jainism and age-specific factors and plan care for the patient in labor  Outcome: Completed     Problem: POSTPARTUM  Goal: Experiences normal postpartum course  Description: INTERVENTIONS:  - Monitor maternal vital signs  - Assess uterine involution and lochia  Outcome: Progressing  Goal: Appropriate maternal -  bonding  Description: INTERVENTIONS:  - Identify family support  - Assess for appropriate maternal/infant bonding   -Encourage maternal/infant bonding opportunities  - Referral to  or  as needed  Outcome: Progressing  Goal: Establishment of infant feeding pattern  Description: INTERVENTIONS:  - Assess breast/bottle feeding  - Refer to lactation as needed  Outcome: Progressing  Goal: Incision(s), wounds(s) or drain site(s) healing without S/S of infection  Description: INTERVENTIONS  - Assess and document dressing, incision, wound bed, drain sites and surrounding tissue  - Provide patient and family education  Outcome: Progressing     Problem: PAIN - ADULT  Goal: Verbalizes/displays adequate comfort level or baseline comfort level  Description: Interventions:  - Encourage patient to  monitor pain and request assistance  - Assess pain using appropriate pain scale  - Administer analgesics based on type and severity of pain and evaluate response  - Implement non-pharmacological measures as appropriate and evaluate response  - Consider cultural and social influences on pain and pain management  - Notify physician/advanced practitioner if interventions unsuccessful or patient reports new pain  Outcome: Progressing     Problem: INFECTION - ADULT  Goal: Absence or prevention of progression during hospitalization  Description: INTERVENTIONS:  - Assess and monitor for signs and symptoms of infection  - Monitor lab/diagnostic results  - Monitor all insertion sites, i.e. indwelling lines, tubes, and drains  - Monitor endotracheal if appropriate and nasal secretions for changes in amount and color  - [place] appropriate cooling/warming therapies per order  - Administer medications as ordered  - Instruct and encourage patient and family to use good hand hygiene technique  - Identify and instruct in appropriate isolation precautions for identified infection/condition  Outcome: Progressing  Goal: Absence of fever/infection during neutropenic period  Description: INTERVENTIONS:  - Monitor WBC    Outcome: Progressing     Problem: SAFETY ADULT  Goal: Patient will remain free of falls  Description: INTERVENTIONS:  - Educate patient/family on patient safety including physical limitations  - Instruct patient to call for assistance with activity   - Consult OT/PT to assist with strengthening/mobility   - Keep Call bell within reach  - Keep bed low and locked with side rails adjusted as appropriate  - Keep care items and personal belongings within reach  - Initiate and maintain comfort rounds  - Make Fall Risk Sign visible to staff  - Apply yellow socks and bracelet for high fall risk patients  - Consider moving patient to room near nurses station  Outcome: Progressing  Goal: Maintain or return to baseline ADL  function  Description: INTERVENTIONS:  -  Assess patient's ability to carry out ADLs; assess patient's baseline for ADL function and identify physical deficits which impact ability to perform ADLs (bathing, care of mouth/teeth, toileting, grooming, dressing, etc.)  - Assess/evaluate cause of self-care deficits   - Assess range of motion  - Assess patient's mobility; develop plan if impaired  - Assess patient's need for assistive devices and provide as appropriate  - Encourage maximum independence but intervene and supervise when necessary  - Involve family in performance of ADLs  - Assess for home care needs following discharge   - Consider OT consult to assist with ADL evaluation and planning for discharge  - Provide patient education as appropriate  Outcome: Progressing  Goal: Maintains/Returns to pre admission functional level  Description: INTERVENTIONS:  - Perform AM-PAC 6 Click Basic Mobility/ Daily Activity assessment daily.  - Set and communicate daily mobility goal to care team and patient/family/caregiver.   - Collaborate with rehabilitation services on mobility goals if consulted  - Out of bed for toileting  - Record patient progress and toleration of activity level   Outcome: Progressing     Problem: Knowledge Deficit  Goal: Patient/family/caregiver demonstrates understanding of disease process, treatment plan, medications, and discharge instructions  Description: Complete learning assessment and assess knowledge base.  Interventions:  - Provide teaching at level of understanding  - Provide teaching via preferred learning methods  Outcome: Progressing     Problem: DISCHARGE PLANNING  Goal: Discharge to home or other facility with appropriate resources  Description: INTERVENTIONS:  - Identify barriers to discharge w/patient and caregiver  - Arrange for needed discharge resources and transportation as appropriate  - Identify discharge learning needs (meds, wound care, etc.)  - Arrange for interpretive  services to assist at discharge as needed  - Refer to Case Management Department for coordinating discharge planning if the patient needs post-hospital services based on physician/advanced practitioner order or complex needs related to functional status, cognitive ability, or social support system  Outcome: Progressing

## 2024-08-21 NOTE — OP NOTE
Section Operative Report - OB/GYN   Gay Alan 29 y.o. female MRN: [id number]  Unit/Bed#:  OR  Encounter: [id number]    Indications:  patient request due to pain in spite of epidural    Pre-operative Diagnosis:   40w1d  Induction pain uncontrolled with epidural  Remote from delivery    Post-operative Diagnosis: same, delivered    Procedure: Primary LTCS    Surgeon:  Giovanna Kirk MD    Assistant(s): Lolis Marley MD  No qualified surgical resident was available to assist in the case.  The assistance of an additional surgeon was critical for completion of the case. The assistant surgeon provided assistance with exposure, hemostasis, delivery of the infant, tissue manipulation, and suturing. The assistant surgeon was present from the start of the procedure until fascial closure. I, the primary surgeon, was present and scrubbed throughout the entirety of the case and performed all key portions of the surgical procedure.    Raoul Group Classification System:  No Multiple pregnancy, No Transverse or oblique lie, No Breech lie, Gestational age is > or =37 weeks, Nulliparous, Labor induced +  is RAOUL GROUP 2a    Findings:  1. Delivery of viable female on 24 at 20:57, weight 7lbs 10.1oz;  Apgar scores of 8 at one minute and 9 at five minutes.   2. Normal appearing placenta with centrally-inserted 3 vessel cord  3. clear amniotic fluid  4. Grossly normal uterus, tubes, and ovaries    Specimens:   1. Arterial and venous cord gases  2. Cord blood  3. Placenta to storage     Estimated Blood Loss:  262 cc           Total IV Fluids: 300 cc    Drains: Li catheter, draining clear yellow urine, 100 cc           Complications:  None; patient tolerated the procedure well.           Disposition:  PACU          Condition: stable    Procedure Details   Decision was made to proceed with  section due to inability to control pain of induction with 2nd epidural and remote from  delivery. Patient requested epidural due to pain and remote from delivery. Risks, benefits, possible complications, alternate treatment options, and expected outcomes were discussed with the patient.  The patient agreed with the proposed plan and gave informed consent.      The patient was taken to the operating room where she was properly identified to the OR staff and attending physician. She had already received epidural anesthesia during her labor course, which was augmented appropriately by Dr. Son preoperatively.  Fetal heart tones were appreciated and found to be appropriate. A Li catheter was already in place and SCDs were placed.  An intravaginal betadine prep was performed.  The abdomen was prepped with Chloraprep and following appropriate drying time, the patient was draped in the usual sterile manner for a Pfannenstiel incision.  The patient had received Clindamycin 900mg IV, gentamicin 320mg IV and Azithromycin 500mg.  A Time Out was held and the above information confirmed.  The patient was identified as Gay Alan and the procedure verified as  Delivery.    A Pfannenstiel incision was made and carried down through the underlying subcutaneous tissue to the fascia using a scalpel. Rectus fascia was then incised in the midline and extended laterally using curved Tan scissors. The inferior edge of the fascia was grasped with Kocher clamps and the underlying muscle was dissected with a combination of blunt and sharp dissection. The superior edge was grasped with Kocher clamps and cleared in similar fashion.  All anatomic layers were well-demarcated.    The rectus muscles were  in the midline and the peritoneum was identified and subsequently entered and extended longitudinally with blunt dissection. The vesicouterine peritoneum was identified and a bladder flap was created using Metzenbaum scissors and developed bluntly. The bladder blade was inserted.      A low  transverse uterine incision was made with the scalpel and extended laterally with blunt dissection. The amnion was ruptured.  The surgeons hand was inserted through the hysterotomy and the fetal head was palpated, elevated, and delivered through the uterine incision with the assistance of fundal pressure.  Baby had spontaneous cry with good color and tone.  After 30 seconds of delayed cord clamping, the umbilical cord was clamped and cut.  The infant was handed off to the  providers.  Arterial and venous cord gases and cord blood were obtained for evaluation and promptly sent to the lab.  The placenta delivered spontaneously with uterine fundal massage and was noted to have a centrally inserted 3 vessel cord.  This was also sent to the lab for storage.    The uterus was exteriorized and a lap sponge was used to clear the cavity of clots and products of conception. The uterine incision was closed with a running locked suture of 0 Vicryl. A second layer of the same suture was used to imbricate the first.  Good hemostasis was confirmed upon uterine closure.  The posterior culdesac was cleared of clots and debris and the uterus was returned to the abdomen.  The paracolic gutters were inspected and cleared of all clots and debris with moist lap sponges.  The uterine incision was examined again and hemostatic.  The fascia was closed with a running suture of 0 Vicryl.  The subcutaneous tissue was irrigated and reapproximated with 2-0 Plain Gut suture. The skin was closed with Insorb absorbable staples and the incision dressed with steri-strips.  Sterile dressing was applied.     At the conclusion of the procedure, all needle, sponge, and instrument counts were noted to be correct x3.  The patient tolerated the procedure well and was transferred to the PACU in stable condition.     Giovanna Kirk MD

## 2024-08-21 NOTE — ANESTHESIA POSTPROCEDURE EVALUATION
Post-Op Assessment Note    CV Status:  Stable    Pain management: adequate      Post-op block assessment: catheter intact   Mental Status:  Alert and awake   Hydration Status:  Stable   PONV Controlled:  Controlled   Airway Patency:  Patent     Post Op Vitals Reviewed: Yes    No anethesia notable event occurred.    Staff: Anesthesiologist, CRNA               BP   105/78   Temp      Pulse  89   Resp   18   SpO2   98

## 2024-08-22 PROCEDURE — 99024 POSTOP FOLLOW-UP VISIT: CPT | Performed by: OBSTETRICS & GYNECOLOGY

## 2024-08-22 RX ADMIN — ACETAMINOPHEN 650 MG: 325 TABLET, FILM COATED ORAL at 07:21

## 2024-08-22 RX ADMIN — DOCUSATE SODIUM 100 MG: 100 CAPSULE, LIQUID FILLED ORAL at 09:17

## 2024-08-22 RX ADMIN — IBUPROFEN 600 MG: 600 TABLET, FILM COATED ORAL at 21:06

## 2024-08-22 RX ADMIN — ACETAMINOPHEN 650 MG: 325 TABLET, FILM COATED ORAL at 13:30

## 2024-08-22 RX ADMIN — IBUPROFEN 600 MG: 600 TABLET, FILM COATED ORAL at 15:14

## 2024-08-22 RX ADMIN — IBUPROFEN 600 MG: 600 TABLET, FILM COATED ORAL at 03:09

## 2024-08-22 RX ADMIN — DOCUSATE SODIUM 100 MG: 100 CAPSULE, LIQUID FILLED ORAL at 18:00

## 2024-08-22 RX ADMIN — IBUPROFEN 600 MG: 600 TABLET, FILM COATED ORAL at 09:17

## 2024-08-22 NOTE — PROGRESS NOTES
"Progress Note - OB/GYN  Post-Partum Physician Note   Gay Alan 29 y.o. female MRN: [id number]  Unit/Bed#:   Encounter: [id number]    Patient is postop and postpartum day 2 from a  (primary) with spinal anesthesia.    Subjective:   Pain: no  Tolerating Oral Intake: yes  Voiding: yes  Flatus: yes  Bowel Movement: no  Ambulating: yes  Breastfeeding: Breastfeeding  Shortness of Breath: no  Leg Pain/Discomfort: no  Lochia: normal      Objective:   Vitals:    24 1900 24 2326 24 0309 24 0721   BP: 91/67 93/53 106/74 96/62   BP Location: Right arm Right arm Right arm Left arm   Pulse: 94 91 91 68   Resp:    Temp: 98.4 °F (36.9 °C) 98.2 °F (36.8 °C) 97.6 °F (36.4 °C) 98.2 °F (36.8 °C)   TempSrc: Oral Oral Oral Oral   SpO2: 96%  98% 98%   Weight:       Height:           Intake/Output Summary (Last 24 hours) at 2024 1145  Last data filed at 2024 1730  Gross per 24 hour   Intake --   Output 1200 ml   Net -1200 ml       Physical Exam:  General: in no apparent distress  Abdomen: abdomen is soft without significant tenderness  Fundus: Firm and non-tender, 1 below the umbilicus  Incision: C/D/I, steri strips in place  Lower extremities: nontender, trace edema      Labs/Tests:   Lab Results   Component Value Date    WBC 20.63 (H) 2024    HGB 11.9 2024    HCT 34.8 2024    MCV 98 2024     2024       Brief OB Lab review:  ABO Grouping   Date Value Ref Range Status   2024 A  Final      Rh Factor   Date Value Ref Range Status   2024 Negative  Final     Rh Type   Date Value Ref Range Status   2024 Negative  Final     Comment:     Please note: Prior records for this patient's ABO / Rh type are not  available for additional verification.      No results found for: \"ANTIBODYSCR\"  External Rubella IGG Quantitation   Date Value Ref Range Status   2024 immune  Final         MEDS:   Current Facility-Administered " Medications   Medication Dose Route Frequency    acetaminophen (TYLENOL) tablet 650 mg  650 mg Oral Q6H [facility]    aluminum-magnesium hydroxide-simethicone (MAALOX) oral suspension 15 mL  15 mL Oral Q6H PRN    bisacodyl (DULCOLAX) rectal suppository 10 mg  10 mg Rectal Daily PRN    diphenhydrAMINE (BENADRYL) injection 25 mg  25 mg Intravenous Q6H PRN    docusate sodium (COLACE) capsule 100 mg  100 mg Oral BID    fentaNYL (SUBLIMAZE) injection 50 mcg  50 mcg Intravenous Q3 min PRN    HYDROmorphone (DILAUDID) injection 0.5 mg  0.5 mg Intravenous Q2H PRN    HYDROmorphone HCl (DILAUDID) injection 0.2 mg  0.2 mg Intravenous Q5 Min PRN    ibuprofen (MOTRIN) tablet 600 mg  600 mg Oral Q6H    lactated ringers infusion  125 mL/hr Intravenous Continuous    lactated ringers infusion  125 mL/hr Intravenous Continuous    ondansetron (ZOFRAN) injection 4 mg  4 mg Intravenous Once PRN    oxyCODONE (ROXICODONE) IR tablet 5 mg  5 mg Oral Q4H PRN    ropivacaine 0.2% PCEA   Epidural Continuous    simethicone (MYLICON) chewable tablet 80 mg  80 mg Oral 4x Daily PRN     Invasive Devices       None                   Assessment and Plan:  29 y.o. year-old , postpartum day 2 status-post  , Low Transverse.    Continue routine postpartum care  Encourage ambulation  Advance diet as tolerated  VTE ppx: postop SDCs, ambulation   (Postpartum VTE risk score of 1 - treat if >/= 3)  Planning discharge tomorrow.          Lolis Marley MD

## 2024-08-22 NOTE — PLAN OF CARE

## 2024-08-22 NOTE — LACTATION NOTE
This note was copied from a baby's chart.  CONSULT - LACTATION  Baby Girl Alan (Allison) 2 days female MRN: [id number]    [facility] NURSERY Room / Bed:  202(N)/ 202(N) Encounter: [id number]    Maternal Information     MOTHER:  Gay Alan  Maternal Age: 29 y.o.  OB History: # 1 - Date: 24, Sex: Female, Weight: 3460 g (7 lb 10.1 oz), GA: 40w1d, Type: , Low Transverse, Apgar1: 8, Apgar5: 9, Living: Living, Birth Comments: None   Previouse breast reduction surgery? No    Lactation history:   Has patient previously breast fed: No   How long had patient previously breast fed:     Previous breast feeding complications:       Past Surgical History:   Procedure Laterality Date    ME  DELIVERY ONLY N/A 2024    Procedure:  SECTION ();  Surgeon: Giovanna Kirk MD;  [facility]: [facility];  Service: Obstetrics    WISDOM TOOTH EXTRACTION  2016       Birth information:  YOB: 2025   Time of birth: 8:57 PM   Sex: female   Delivery type: , Low Transverse   Birth Weight: 3460 g (7 lb 10.1 oz)   Percent of Weight Change: -6%     Gestational Age: 40w1d   [unfilled]    Assessment     Breast and nipple assessment:  normal assessment, soft tissue, nipple chai but inverts slightly on compression.     Assessment:  tongue comes to lips, cups strongly, suction felt on entire finger tip    Feeding assessment:  fussy, with a tea cup hold fed 10 minutes, on-off using breast compression and provided 0.5 ml of colostrum via syringe  LATCH:  Latch: Grasps breast, tongue down, lips flanged, rhythmic sucking   Audible Swallowing: A few with stimulation   Type of Nipple: Everted (After stimulation) (soft nipple)   Comfort (Breast/Nipple): Soft/non-tender   Hold (Positioning): Partial assist, teach one side, mother does other, staff holds   LATCH Score: 8          Feeding recommendations:   offer breasts, both if possible,  5-10 minutes and pump/hand express both breasts to provide expressed colostrum via alternative manner. May begin donor milk with supplement if baby is looking for more (5-10 ml day 2).     Primary RN made aware of encounter and feeding plan.    Yoli Hodges RN 8/22/2024 11:00 AM

## 2024-08-22 NOTE — PLAN OF CARE
Problem: PAIN - ADULT  Goal: Verbalizes/displays adequate comfort level or baseline comfort level  Description: Interventions:  - Encourage patient to monitor pain and request assistance  - Assess pain using appropriate pain scale  - Administer analgesics based on type and severity of pain and evaluate response  - Implement non-pharmacological measures as appropriate and evaluate response  - Consider cultural and social influences on pain and pain management  - Notify physician/advanced practitioner if interventions unsuccessful or patient reports new pain  Outcome: Progressing     Problem: INFECTION - ADULT  Goal: Absence or prevention of progression during hospitalization  Description: INTERVENTIONS:  - Assess and monitor for signs and symptoms of infection  - Monitor lab/diagnostic results  - Monitor all insertion sites, i.e. indwelling lines, tubes, and drains  - Monitor endotracheal if appropriate and nasal secretions for changes in amount and color  - [place] appropriate cooling/warming therapies per order  - Administer medications as ordered  - Instruct and encourage patient and family to use good hand hygiene technique  - Identify and instruct in appropriate isolation precautions for identified infection/condition  Outcome: Progressing  Goal: Absence of fever/infection during neutropenic period  Description: INTERVENTIONS:  - Monitor WBC    Outcome: Progressing     Problem: SAFETY ADULT  Goal: Patient will remain free of falls  Description: INTERVENTIONS:  - Educate patient/family on patient safety including physical limitations  - Instruct patient to call for assistance with activity   - Consult OT/PT to assist with strengthening/mobility   - Keep Call bell within reach  - Keep bed low and locked with side rails adjusted as appropriate  - Keep care items and personal belongings within reach  - Initiate and maintain comfort rounds  - Make Fall Risk Sign visible to staff  - Apply yellow socks and bracelet  for high fall risk patients  - Consider moving patient to room near nurses station  Outcome: Progressing  Goal: Maintain or return to baseline ADL function  Description: INTERVENTIONS:  -  Assess patient's ability to carry out ADLs; assess patient's baseline for ADL function and identify physical deficits which impact ability to perform ADLs (bathing, care of mouth/teeth, toileting, grooming, dressing, etc.)  - Assess/evaluate cause of self-care deficits   - Assess range of motion  - Assess patient's mobility; develop plan if impaired  - Assess patient's need for assistive devices and provide as appropriate  - Encourage maximum independence but intervene and supervise when necessary  - Involve family in performance of ADLs  - Assess for home care needs following discharge   - Consider OT consult to assist with ADL evaluation and planning for discharge  - Provide patient education as appropriate  Outcome: Progressing  Goal: Maintains/Returns to pre admission functional level  Description: INTERVENTIONS:  - Perform AM-PAC 6 Click Basic Mobility/ Daily Activity assessment daily.  - Set and communicate daily mobility goal to care team and patient/family/caregiver.   - Collaborate with rehabilitation services on mobility goals if consulted  - Out of bed for toileting  - Record patient progress and toleration of activity level   Outcome: Progressing     Problem: Knowledge Deficit  Goal: Patient/family/caregiver demonstrates understanding of disease process, treatment plan, medications, and discharge instructions  Description: Complete learning assessment and assess knowledge base.  Interventions:  - Provide teaching at level of understanding  - Provide teaching via preferred learning methods  Outcome: Progressing     Problem: DISCHARGE PLANNING  Goal: Discharge to home or other facility with appropriate resources  Description: INTERVENTIONS:  - Identify barriers to discharge w/patient and caregiver  - Arrange for needed  discharge resources and transportation as appropriate  - Identify discharge learning needs (meds, wound care, etc.)  - Arrange for interpretive services to assist at discharge as needed  - Refer to Case Management Department for coordinating discharge planning if the patient needs post-hospital services based on physician/advanced practitioner order or complex needs related to functional status, cognitive ability, or social support system  Outcome: Progressing     Problem: POSTPARTUM  Goal: Experiences normal postpartum course  Description: INTERVENTIONS:  - Monitor maternal vital signs  - Assess uterine involution and lochia  Outcome: Progressing  Goal: Appropriate maternal -  bonding  Description: INTERVENTIONS:  - Identify family support  - Assess for appropriate maternal/infant bonding   -Encourage maternal/infant bonding opportunities  - Referral to  or  as needed  Outcome: Progressing  Goal: Establishment of infant feeding pattern  Description: INTERVENTIONS:  - Assess breast/bottle feeding  - Refer to lactation as needed  Outcome: Progressing  Goal: Incision(s), wounds(s) or drain site(s) healing without S/S of infection  Description: INTERVENTIONS  - Assess and document dressing, incision, wound bed, drain sites and surrounding tissue  - Provide patient and family education  Outcome: Progressing

## 2024-08-23 VITALS
OXYGEN SATURATION: 98 % | HEIGHT: 64 IN | DIASTOLIC BLOOD PRESSURE: 81 MMHG | TEMPERATURE: 98.3 F | RESPIRATION RATE: 18 BRPM | HEART RATE: 78 BPM | SYSTOLIC BLOOD PRESSURE: 121 MMHG | BODY MASS INDEX: 29.19 KG/M2 | WEIGHT: 171 LBS

## 2024-08-23 PROCEDURE — NC001 PR NO CHARGE: Performed by: OBSTETRICS & GYNECOLOGY

## 2024-08-23 PROCEDURE — 99024 POSTOP FOLLOW-UP VISIT: CPT | Performed by: OBSTETRICS & GYNECOLOGY

## 2024-08-23 RX ORDER — ACETAMINOPHEN 325 MG/1
650 TABLET ORAL EVERY 6 HOURS SCHEDULED
Start: 2024-08-23 | End: 2024-08-26

## 2024-08-23 RX ORDER — IBUPROFEN 600 MG/1
600 TABLET, FILM COATED ORAL EVERY 6 HOURS
Start: 2024-08-23

## 2024-08-23 RX ADMIN — ACETAMINOPHEN 650 MG: 325 TABLET, FILM COATED ORAL at 07:30

## 2024-08-23 RX ADMIN — DOCUSATE SODIUM 100 MG: 100 CAPSULE, LIQUID FILLED ORAL at 09:33

## 2024-08-23 RX ADMIN — IBUPROFEN 600 MG: 600 TABLET, FILM COATED ORAL at 05:22

## 2024-08-23 NOTE — DISCHARGE SUMMARY
Discharge Summary - OB/GYN   Gay Alan 29 y.o. female MRN: [id number]  Unit/Bed#: -01 Encounter: [id number]      Admission Date: 2024     Discharge Date: 24    Principal Diagnosis: Encounter for elective induction of labor [Z34.90],  Pregnancy at 40w1d    Secondary Diagnosis: Primary  section due to maternal request    Procedures: primary  section, low transverse incision    Attending - Delivery: Latasha Leblanc MD         - Discharge: Deepa Calderon DO    Hospital Course:     Gay Alan is a 29 y.o.  at 40w1d wks who was initially admitted for elective IOL. She reached to 4cm dilation, however started having unbearable pain. Epidural was replaced and still no improvement in pain. At that time patient and partner requested a  section.     She delivered a viable  on 24 at 2057. Weight 3460 grams via primary  section, low transverse incision. Apgars were 8 (1 min) and 9 (5 min).  was transferred to  nursery. Patient tolerated the procedure well and was transferred to recovery in stable condition.     Her postpartum course was uncomplicated. Preoperative hemoglobin was 13.1, postoperative was 11.9. Her postoperative pain was well controlled with oral analgesics.    On day of discharge, she was ambulating and able to reasonably perform all ADLs. She was voiding and had appropriate bowel function. Pain was well controlled. She was discharged home on post-operative day #2 without complications. Patient was instructed to follow up with her OB as an outpatient and was given appropriate warnings to call provider if she develops signs of infection or uncontrolled pain.    Complications: none apparent    Condition at discharge: good     Physical Exam at discharge:  Vitals:    24 1514 24 1916 24 2239 24 0719   BP: 108/76 103/70 110/79 121/81   BP Location: Left arm Left  arm Left arm Right arm   Pulse: 87 88 79 78   Resp: 16 18 18 18   Temp: 98.5 °F (36.9 °C) 98.1 °F (36.7 °C) 98.7 °F (37.1 °C) 98.3 °F (36.8 °C)   TempSrc: Oral Oral Oral Oral   SpO2: 98% 97%  98%   Weight:       Height:         General: in no apparent distress and well developed and well nourished  Abdomen: abdomen is soft without significant tenderness, masses, organomegaly or guarding  Fundus: Firm, 1 below the umbilicus  Incision: C/D/I  Lower extremeties: nontender    Discharge instructions/Information to patient and family:   See after visit summary for information provided to patient and family.      Provisions for Follow-Up Care:  See after visit summary for information related to follow-up care and any pertinent home health orders.      Disposition: Home    Planned Readmission: No    Discharge Medications:  For a complete list of the patient's medications, please refer to her medication reconciliation.    Discharge Statement   I spent 20 minutes discharging the patient. This time was spent on the day of discharge. I had direct contact with the patient on the day of discharge. Additional documentation is required if more than 30 minutes were spent on discharge.     Deepa Calderon DO  8/23/2024 8:35 AM

## 2024-08-23 NOTE — LACTATION NOTE
"This note was copied from a baby's chart.  Met with parents to follow up and discuss the Breastfeeding Discharge Booklet with plans for discharge this morning.    Parents discussed thoughts about feeding plan and decided to continue to pump until mature milk comes in an purchase donor milk. If mother decides to dry up supply, parents discussed formula feeding. They will be provided with a booklet on safe formula preparation and a handout on \"How to Dry Up the Milk Supply\" that was discussed.    Baby is currently at a 8.1% weight loss, having appropriate output for her age and 24 hour bilirubin was low.    Donor milk purchase was purchased through [facility] over the phone and confirmed payment for 800 ml of donor milk (200 ml bottles available on unit)    Bottles:  1) [id number] (20)  2) 642511-5 (30)  3) [id number] (33)  4) [id number] (39)  Expiration: 1/04/24    Parents received donor milk education handouts from primary RN. Discussed milk storage and handling for donor milk usage.    Discussed the importance of ensuring that baby feeds 8-12x in 24 hours and that baby has 6 wet diapers or more that are becoming more dilute as well as soiled diapers that are transitioning demonstrated by color change from meconium to a yellow/gold seedy loose stool by day 5.    Mother was given resources to look up medications to ensure they are safe with breastfeeding, by communicating with the [facility], [facility], [facility] as well as using [employer] (assisted mother to pin to home screen on personal phone).    Discussed engorgement time frame (when mature milk comes in) and management as well as how to deal with conditions that may occur while breastfeeding (plugged ducts, milk blebs and mastitis) and when is appropriate to communicate with her OB/GYN and/or a lactation consultant.    Reviewed how to set up a pump, how to cycle (stimulation vs expression phases during a pumping session), " importance of flange fit and trying different sizes to ensure best fit, milk storage and how to properly clean parts. Discussed handouts for tips on pumping when returning to work and paced bottle feeding.    Discussed community resources for continued support in breastfeeding once discharged home. Parents will be seeing the pediatrician and a lactation consultant at their practice leaving from the unit today at 11 am.    Parents were given support and encouragement as well as encouraged to call for further questions that arise prior to discharge.

## 2024-08-23 NOTE — PLAN OF CARE
Problem: POSTPARTUM  Goal: Experiences normal postpartum course  Description: INTERVENTIONS:  - Monitor maternal vital signs  - Assess uterine involution and lochia  2024 by Mar Winston RN  Outcome: Completed  2024 by Mar Winston RN  Outcome: Progressing  Goal: Appropriate maternal -  bonding  Description: INTERVENTIONS:  - Identify family support  - Assess for appropriate maternal/infant bonding   -Encourage maternal/infant bonding opportunities  - Referral to  or  as needed  2024 by Mar Winston RN  Outcome: Completed  2024 by Mar Winston RN  Outcome: Progressing  Goal: Establishment of infant feeding pattern  Description: INTERVENTIONS:  - Assess breast/bottle feeding  - Refer to lactation as needed  2024 by Mar Winston RN  Outcome: Completed  2024 by Mar Winston RN  Outcome: Progressing  Goal: Incision(s), wounds(s) or drain site(s) healing without S/S of infection  Description: INTERVENTIONS  - Assess and document dressing, incision, wound bed, drain sites and surrounding tissue  - Provide patient and family education  2024 by Mar Winston RN  Outcome: Completed  2024 by Mar Winston RN  Outcome: Progressing     Problem: PAIN - ADULT  Goal: Verbalizes/displays adequate comfort level or baseline comfort level  Description: Interventions:  - Encourage patient to monitor pain and request assistance  - Assess pain using appropriate pain scale  - Administer analgesics based on type and severity of pain and evaluate response  - Implement non-pharmacological measures as appropriate and evaluate response  - Consider cultural and social influences on pain and pain management  - Notify physician/advanced practitioner if interventions unsuccessful or patient reports new pain  2024 by Mar Winston RN  Outcome: Completed  2024 by Mar Winston RN  Outcome:  Progressing     Problem: INFECTION - ADULT  Goal: Absence or prevention of progression during hospitalization  Description: INTERVENTIONS:  - Assess and monitor for signs and symptoms of infection  - Monitor lab/diagnostic results  - Monitor all insertion sites, i.e. indwelling lines, tubes, and drains  - Monitor endotracheal if appropriate and nasal secretions for changes in amount and color  - [place] appropriate cooling/warming therapies per order  - Administer medications as ordered  - Instruct and encourage patient and family to use good hand hygiene technique  - Identify and instruct in appropriate isolation precautions for identified infection/condition  8/23/2024 0941 by Mar Winston RN  Outcome: Completed  8/23/2024 0825 by Mar Winston RN  Outcome: Progressing  Goal: Absence of fever/infection during neutropenic period  Description: INTERVENTIONS:  - Monitor WBC    8/23/2024 0941 by Mar Winston RN  Outcome: Completed  8/23/2024 0825 by Mar Winston RN  Outcome: Progressing     Problem: SAFETY ADULT  Goal: Patient will remain free of falls  Description: INTERVENTIONS:  - Educate patient/family on patient safety including physical limitations  - Instruct patient to call for assistance with activity   - Consult OT/PT to assist with strengthening/mobility   - Keep Call bell within reach  - Keep bed low and locked with side rails adjusted as appropriate  - Keep care items and personal belongings within reach  - Initiate and maintain comfort rounds  - Make Fall Risk Sign visible to staff  - Apply yellow socks and bracelet for high fall risk patients  - Consider moving patient to room near nurses station  8/23/2024 0941 by Mar Winston RN  Outcome: Completed  8/23/2024 0825 by Mar Winston RN  Outcome: Progressing  Goal: Maintain or return to baseline ADL function  Description: INTERVENTIONS:  -  Assess patient's ability to carry out ADLs; assess patient's baseline for ADL function and  identify physical deficits which impact ability to perform ADLs (bathing, care of mouth/teeth, toileting, grooming, dressing, etc.)  - Assess/evaluate cause of self-care deficits   - Assess range of motion  - Assess patient's mobility; develop plan if impaired  - Assess patient's need for assistive devices and provide as appropriate  - Encourage maximum independence but intervene and supervise when necessary  - Involve family in performance of ADLs  - Assess for home care needs following discharge   - Consider OT consult to assist with ADL evaluation and planning for discharge  - Provide patient education as appropriate  8/23/2024 0941 by Mar Winston RN  Outcome: Completed  8/23/2024 0825 by Mar Winston RN  Outcome: Progressing  Goal: Maintains/Returns to pre admission functional level  Description: INTERVENTIONS:  - Perform AM-PAC 6 Click Basic Mobility/ Daily Activity assessment daily.  - Set and communicate daily mobility goal to care team and patient/family/caregiver.   - Collaborate with rehabilitation services on mobility goals if consulted  - Out of bed for toileting  - Record patient progress and toleration of activity level   8/23/2024 0941 by Mar Winston RN  Outcome: Completed  8/23/2024 0825 by Mar Winston RN  Outcome: Progressing     Problem: Knowledge Deficit  Goal: Patient/family/caregiver demonstrates understanding of disease process, treatment plan, medications, and discharge instructions  Description: Complete learning assessment and assess knowledge base.  Interventions:  - Provide teaching at level of understanding  - Provide teaching via preferred learning methods  8/23/2024 0941 by Mar Winston RN  Outcome: Completed  8/23/2024 0825 by Mar Winston RN  Outcome: Progressing     Problem: DISCHARGE PLANNING  Goal: Discharge to home or other facility with appropriate resources  Description: INTERVENTIONS:  - Identify barriers to discharge w/patient and caregiver  - Arrange for  needed discharge resources and transportation as appropriate  - Identify discharge learning needs (meds, wound care, etc.)  - Arrange for interpretive services to assist at discharge as needed  - Refer to Case Management Department for coordinating discharge planning if the patient needs post-hospital services based on physician/advanced practitioner order or complex needs related to functional status, cognitive ability, or social support system  8/23/2024 0941 by Mar Winston, RN  Outcome: Completed  8/23/2024 0825 by Mar Winston, RN  Outcome: Progressing

## 2024-08-23 NOTE — PROGRESS NOTES
Progress Note - OB/GYN  Post-Partum Physician Note   Gay Alan 29 y.o. female MRN: [id number]  Unit/Bed#:  206-01 Encounter: [id number]  Assessment:  29 y.o. year-old , post-op/postpartum day #2 status-post PLTCS    Plan:  Continue routine postpartum care  Encourage ambulation  Advance diet as tolerated  Desires d/c home- instructions reviewed      No problem-specific Assessment & Plan notes found for this encounter.    ______________________________________________  Patient is postop and postpartum day #2 following PLTCS     Subjective:   Pain: controlled with motrin and tylenol  Tolerating Oral Intake: +  Voiding: +  Flatus: +  Bowel Movement: -  Ambulating: +  Breastfeeding: +, but thinking of going to formula feeding  Chest Pain: -  Shortness of Breath: -  Lochia: min    Objective:   Vitals:    24 1514 24 1916 24 2239 24 0719   BP: 108/76 103/70 110/79 121/81   BP Location: Left arm Left arm Left arm Right arm   Pulse: 87 88 79 78   Resp: 16 18 18 18   Temp: 98.5 °F (36.9 °C) 98.1 °F (36.7 °C) 98.7 °F (37.1 °C) 98.3 °F (36.8 °C)   TempSrc: Oral Oral Oral Oral   SpO2: 98% 97%  98%   Weight:       Height:         No intake or output data in the 24 hours ending 24 0832    Physical Exam:  General: in no apparent distress and well developed and well nourished  Abdomen: abdomen is soft without significant tenderness, masses, organomegaly or guarding  Fundus: Firm  Incision: C/D/I  Lower extremeties: nontender      Labs/Tests:   Lab Results   Component Value Date/Time    HGB 11.9 2024 09:07 AM     2024 09:07 AM    WBC 20.63 (H) 2024 09:07 AM        Brief OB Lab review:  ABO Grouping   Date Value Ref Range Status   2024 A  Final      Rh Factor   Date Value Ref Range Status   2024 Negative  Final     Rh Type   Date Value Ref Range Status   2024 Negative  Final     Comment:     Please note: Prior records for this patient's ABO /  "Rh type are not  available for additional verification.      No results found for: \"ANTIBODYSCR\"  No results found for: \"RUBM\"    MEDS:   Current Facility-Administered Medications   Medication Dose Route Frequency    acetaminophen (TYLENOL) tablet 650 mg  650 mg Oral Q6H NANCY    aluminum-magnesium hydroxide-simethicone (MAALOX) oral suspension 15 mL  15 mL Oral Q6H PRN    bisacodyl (DULCOLAX) rectal suppository 10 mg  10 mg Rectal Daily PRN    diphenhydrAMINE (BENADRYL) injection 25 mg  25 mg Intravenous Q6H PRN    docusate sodium (COLACE) capsule 100 mg  100 mg Oral BID    fentaNYL (SUBLIMAZE) injection 50 mcg  50 mcg Intravenous Q3 min PRN    HYDROmorphone (DILAUDID) injection 0.5 mg  0.5 mg Intravenous Q2H PRN    HYDROmorphone HCl (DILAUDID) injection 0.2 mg  0.2 mg Intravenous Q5 Min PRN    ibuprofen (MOTRIN) tablet 600 mg  600 mg Oral Q6H    lactated ringers infusion  125 mL/hr Intravenous Continuous    lactated ringers infusion  125 mL/hr Intravenous Continuous    ondansetron (ZOFRAN) injection 4 mg  4 mg Intravenous Once PRN    oxyCODONE (ROXICODONE) IR tablet 5 mg  5 mg Oral Q4H PRN    ropivacaine 0.2% PCEA   Epidural Continuous    simethicone (MYLICON) chewable tablet 80 mg  80 mg Oral 4x Daily PRN     Invasive Devices       None                     Deepa Calderon DO  8/23/2024 8:32 AM              "

## 2024-08-23 NOTE — PLAN OF CARE
Problem: PAIN - ADULT  Goal: Verbalizes/displays adequate comfort level or baseline comfort level  Description: Interventions:  - Encourage patient to monitor pain and request assistance  - Assess pain using appropriate pain scale  - Administer analgesics based on type and severity of pain and evaluate response  - Implement non-pharmacological measures as appropriate and evaluate response  - Consider cultural and social influences on pain and pain management  - Notify physician/advanced practitioner if interventions unsuccessful or patient reports new pain  Outcome: Progressing     Problem: INFECTION - ADULT  Goal: Absence or prevention of progression during hospitalization  Description: INTERVENTIONS:  - Assess and monitor for signs and symptoms of infection  - Monitor lab/diagnostic results  - Monitor all insertion sites, i.e. indwelling lines, tubes, and drains  - Monitor endotracheal if appropriate and nasal secretions for changes in amount and color  - [employer] appropriate cooling/warming therapies per order  - Administer medications as ordered  - Instruct and encourage patient and family to use good hand hygiene technique  - Identify and instruct in appropriate isolation precautions for identified infection/condition  Outcome: Progressing  Goal: Absence of fever/infection during neutropenic period  Description: INTERVENTIONS:  - Monitor WBC    Outcome: Progressing     Problem: SAFETY ADULT  Goal: Patient will remain free of falls  Description: INTERVENTIONS:  - Educate patient/family on patient safety including physical limitations  - Instruct patient to call for assistance with activity   - Consult OT/PT to assist with strengthening/mobility   - Keep Call bell within reach  - Keep bed low and locked with side rails adjusted as appropriate  - Keep care items and personal belongings within reach  - Initiate and maintain comfort rounds  - Make Fall Risk Sign visible to staff  - Apply yellow socks and bracelet  for high fall risk patients  - Consider moving patient to room near nurses station  Outcome: Progressing  Goal: Maintain or return to baseline ADL function  Description: INTERVENTIONS:  -  Assess patient's ability to carry out ADLs; assess patient's baseline for ADL function and identify physical deficits which impact ability to perform ADLs (bathing, care of mouth/teeth, toileting, grooming, dressing, etc.)  - Assess/evaluate cause of self-care deficits   - Assess range of motion  - Assess patient's mobility; develop plan if impaired  - Assess patient's need for assistive devices and provide as appropriate  - Encourage maximum independence but intervene and supervise when necessary  - Involve family in performance of ADLs  - Assess for home care needs following discharge   - Consider OT consult to assist with ADL evaluation and planning for discharge  - Provide patient education as appropriate  Outcome: Progressing  Goal: Maintains/Returns to pre admission functional level  Description: INTERVENTIONS:  - Perform AM-PAC 6 Click Basic Mobility/ Daily Activity assessment daily.  - Set and communicate daily mobility goal to care team and patient/family/caregiver.   - Collaborate with rehabilitation services on mobility goals if consulted  - Out of bed for toileting  - Record patient progress and toleration of activity level   Outcome: Progressing     Problem: Knowledge Deficit  Goal: Patient/family/caregiver demonstrates understanding of disease process, treatment plan, medications, and discharge instructions  Description: Complete learning assessment and assess knowledge base.  Interventions:  - Provide teaching at level of understanding  - Provide teaching via preferred learning methods  Outcome: Progressing     Problem: DISCHARGE PLANNING  Goal: Discharge to home or other facility with appropriate resources  Description: INTERVENTIONS:  - Identify barriers to discharge w/patient and caregiver  - Arrange for needed  discharge resources and transportation as appropriate  - Identify discharge learning needs (meds, wound care, etc.)  - Arrange for interpretive services to assist at discharge as needed  - Refer to Case Management Department for coordinating discharge planning if the patient needs post-hospital services based on physician/advanced practitioner order or complex needs related to functional status, cognitive ability, or social support system  Outcome: Progressing     Problem: POSTPARTUM  Goal: Experiences normal postpartum course  Description: INTERVENTIONS:  - Monitor maternal vital signs  - Assess uterine involution and lochia  Outcome: Progressing  Goal: Appropriate maternal -  bonding  Description: INTERVENTIONS:  - Identify family support  - Assess for appropriate maternal/infant bonding   -Encourage maternal/infant bonding opportunities  - Referral to  or  as needed  Outcome: Progressing  Goal: Establishment of infant feeding pattern  Description: INTERVENTIONS:  - Assess breast/bottle feeding  - Refer to lactation as needed  Outcome: Progressing  Goal: Incision(s), wounds(s) or drain site(s) healing without S/S of infection  Description: INTERVENTIONS  - Assess and document dressing, incision, wound bed, drain sites and surrounding tissue  - Provide patient and family education  Outcome: Progressing

## 2024-08-23 NOTE — PLAN OF CARE

## 2024-08-30 LAB — PLACENTA IN STORAGE: NORMAL

## 2024-09-08 NOTE — PROGRESS NOTES
Routine Post Partum Visit  [facility] - [address], [address], [place] [address]    Assessment/Plan:  Gay is a 29 y.o. year old  who presents for postpartum visit.    Routine Postpartum Care  Normal postpartum exam  Contraception: OCP (estrogen/progesterone)  Depression Screen: PPD screen score: 8; negative  Feeding:  She is bottle feeding.  Psychosocial support: good  Cervical cancer screening Up to Date, next due 2 months  Follow up in: 3 months for wellness visit, or as needed.    Additional Problems:  1. Postpartum care and examination  2. Encounter for BCP (birth control pills) initial prescription  A/P:   Reviewed contraceptive options including barrier methods, oral contraceptives, patch, ring, and IUD. Risks, benefits and limitations of hormonal contraction discussed - including increase risk of DVT/PE w/ all estrogen containing contraceptives.  No Contraindication to use identified. Reviewed that only condoms protect against STIs.  Will give pt 3 months of contraceptive.  To start at 4 weeks pp, no sex until 6 weeks. Return in 3 months to review.    -     norethindrone-ethinyl estradiol (JUNEL FE 1/20) 1-20 MG-MCG per tablet; Take 1 tablet by mouth daily      Next visit: 3 months postpartum    Subjective:     CC: Postpartum visit    Gay Alan is a 29 y.o. y.o. female  who presents for a postpartum visit.     She is 3 week postpartum following a low cervical transverse  section on 2024 at 40 weeks, for patient request for no active labor after 12 hours of induction and epidural not adequate to control pain.    Outcome: primary  section, low transverse incision,   Anesthesia: epidural. Postpartum course has been uncomplicated. Baby's course has been uncomplicated. Baby is feeding by She is bottle feeding..     Bleeding thin lochia. Bowel function is normal. Bladder function is normal. Patient is not sexually active since delivery.  "Contraception method is OCP (estrogen/progesterone). Postpartum depression screening: negative.    The following portions of the patient's history were reviewed and updated as appropriate: allergies, current medications, past family history, past medical history, obstetric history, gynecologic history, past social history, past surgical history and problem list.      Objective:  /64 (BP Location: Right arm, Patient Position: Sitting, Cuff Size: Large)   Ht 5' 3.5\" (1.613 m)   Wt 69.4 kg (153 lb)   LMP 11/13/2023 (Exact Date)   Breastfeeding No   BMI 26.68 kg/m²   Pregravid Weight/BMI: 65.8 kg (145 lb) (BMI 24.88)  Current Weight: 69.4 kg (153 lb)   Total Weight Gain: 11.8 kg (26 lb)     General: Well appearing, no distress.  Mood and affect: Appropriate.  Abdomen: Soft, nontender  Incision: healed  Vulva: normal  Vagina: normal, normal lochia  Cervix: closed, no bleeding  Uterus: non-tender, slightly enlarged - normal 3wk pp  Adnexa: no masses, no tenderness  "

## 2024-09-08 NOTE — PATIENT INSTRUCTIONS
Start birth control pills after 4 weeks postpartum   - not effective for first 2 weeks   - no sex until 6 weeks    Postpartum   - bleeding may continue postpartum for up to 6-8 weeks, but overall should be decreasing.  Sometimes, even if breast feeding, your period may start about 6 weeks after delivery, some may take longer to return   - increase activity slowly as you feel ready.  No vigorous physical exercise, running/jogging, or aerobic activity until full 6 weeks postpartum.  As increasing activity - start slowly and listen to your body.     - no lifting more than the baby in car seat until 6 full weeks postpartum, especially if you had a  or vaginal stitches, unless instructed differently by your doctor.   - okay to resume intercourse after 6 full weeks following delivery, be sure to use contraception.  It is possible to get pregnant before your first period.     - for information on birth control please see website www.bedsider.org - for all things birth control   - if breast feeding/nursing, periods may not return right away.  They may return after some time or not until you completely stop nursing.  Everyone is different.    For additional information on postpartum period see the following resources below:   - [facility], ph: [phone], [email or web address]  Lactation and mental health support   - Fourth Trimester Project - www.[employer]DialMyApp  Resource for all things postpartum   - National Suicide and Crisis Lifeline - 988  If you or someone you know is experiencing emotional distress, crisis, or having suicidal thoughts, please contact the Suicide and Crisis Lifeline  Text or call 988 from landline or cell phone for  support  Visit [employer] to chat, button in the top right-hand corner of the screen

## 2024-09-10 ENCOUNTER — POSTPARTUM VISIT (OUTPATIENT)
Dept: OBGYN CLINIC | Facility: CLINIC | Age: 29
End: 2024-09-10

## 2024-09-10 VITALS
HEIGHT: 64 IN | SYSTOLIC BLOOD PRESSURE: 102 MMHG | WEIGHT: 153 LBS | BODY MASS INDEX: 26.12 KG/M2 | DIASTOLIC BLOOD PRESSURE: 64 MMHG

## 2024-09-10 DIAGNOSIS — Z30.011 ENCOUNTER FOR BCP (BIRTH CONTROL PILLS) INITIAL PRESCRIPTION: ICD-10-CM

## 2024-09-10 PROCEDURE — 99024 POSTOP FOLLOW-UP VISIT: CPT | Performed by: OBSTETRICS & GYNECOLOGY

## 2024-09-10 RX ORDER — NORETHINDRONE ACETATE AND ETHINYL ESTRADIOL 1MG-20(21)
1 KIT ORAL DAILY
Qty: 84 TABLET | Refills: 1 | Status: SHIPPED | OUTPATIENT
Start: 2024-09-10

## 2024-10-07 ENCOUNTER — NURSE TRIAGE (OUTPATIENT)
Age: 29
End: 2024-10-07

## 2024-10-07 ENCOUNTER — POSTPARTUM VISIT (OUTPATIENT)
Dept: OBGYN CLINIC | Facility: CLINIC | Age: 29
End: 2024-10-07
Payer: COMMERCIAL

## 2024-10-07 VITALS
SYSTOLIC BLOOD PRESSURE: 100 MMHG | WEIGHT: 150 LBS | BODY MASS INDEX: 25.61 KG/M2 | DIASTOLIC BLOOD PRESSURE: 70 MMHG | HEIGHT: 64 IN

## 2024-10-07 PROCEDURE — 99213 OFFICE O/P EST LOW 20 MIN: CPT | Performed by: OBSTETRICS & GYNECOLOGY

## 2024-10-07 RX ORDER — SERTRALINE HYDROCHLORIDE 25 MG/1
25 TABLET, FILM COATED ORAL DAILY
Qty: 30 TABLET | Refills: 6 | Status: SHIPPED | OUTPATIENT
Start: 2024-10-07 | End: 2025-05-05

## 2024-10-07 NOTE — TELEPHONE ENCOUNTER
"Patient delivered 8/20.  She reports she started late last week with 'baby blues.\"  She reports feeling overwhelmed, doesn't have family nearby so they don't have much help.  She reports her  works full time so she doesn't feel like she gets much of a break.  She denies SI/HI.  She reports difficulty falling asleep, but ok once sleeping.  Baby gets up about 2x a night, gets about 3-3.5 hour stretches.  She does have a therapist she sees and does have an appointment later today.  She was given an appointment for today.  No further questions or concerns at this time.      Reason for Disposition   Depression symptoms occurring > 1 month after delivery    Answer Assessment - Initial Assessment Questions  1. CONCERN: \"What happened that made you call today?\"      Feeling overwhelmed.   2. DEPRESSION SYMPTOM SCREENING: \"How are you feeling overall?\" (e.g., decreased energy, increased sleeping or difficulty sleeping, difficulty concentrating, feelings of sadness, guilt, hopelessness, or worthlessness; problems caring for baby)      Feelings of sadness, difficulty concentrating.  3. RISK OF HARM - SUICIDAL IDEATION:  \"Do you ever have thoughts of hurting yourself or the baby?\"  (e.g., yes, no; details).    - INTENT:  \"Do you have thoughts of hurting yourself or the baby right NOW?\" (e.g., yes, no, N/A)    - PLAN: \"Do you have a specific plan for how you would do this?\" (e.g., gun, knife, overdose, no plan, N/A)      denies  4. RISK OF HARM - HOMICIDAL IDEATION:  \"Do you ever have thoughts of hurting or killing someone else?\"  (e.g., yes, no, no but preoccupation with thoughts about death)    - INTENT:  \"Do you have thoughts of hurting or killing someone right NOW?\" (e.g., yes, no, N/A)    - PLAN: \"Do you have a specific plan for how you would do this?\" (e.g., gun, knife, no plan, N/A)       denies  5. FUNCTIONAL IMPAIRMENT: \"How have things been going for you overall? Have you had more difficulty than usual doing " "your normal daily activities?\"  (e.g., better, same, worse; self-care, school, work, interactions)      denies  6. SUPPORT: \"Who is with you now?\" \"Who do you live with?\" \"Do you have family or friends who you can talk to?\"         and baby.  Yes, but hasn't shared yet.    7. THERAPIST: \"Do you have a counselor or therapist? Name?\"      Yes, Blanca Moreno, has cassy later today.  8. STRESSORS: \"Has there been any new stress or recent changes in your life?\"      Being a new mom.  9. ALCOHOL USE OR SUBSTANCE USE (DRUG USE): \"Do you drink alcohol or use any illegal drugs?\"       denies  10. OTHER: \"Do you have any other physical symptoms right now?\" (e.g., fever)        denies  11. DELIVERY DATE: \"When was your delivery date?\"        8/20    Protocols used: Postpartum - Depression-ADULT-[place]    "

## 2024-10-07 NOTE — PROGRESS NOTES
Assessment/Plan:      Diagnoses and all orders for this visit:    Postpartum depression  -  management with medication in addition to scheduled follow up with therapist reviewed.  -  patient in agreement to start lowest dose of Zoloft and observe symptoms.  Patient to increase dose to 50 mg if feeling sad after 2 weeks of Zoloft 25 mg.  -  encouraged to ask for assistance at home during the day to be able to rest  -     sertraline (Zoloft) 25 mg tablet; Take 1 tablet (25 mg total) by mouth daily          Subjective:     Patient ID: Gay Alan is a 29 y.o. female.    Patient presents with concern about crying spontaneously and feeling exhausted during the day taking care of new born and unable to get a break.  Spouse works from home and MIL comes to help once a week.  Patient denies SI/HI.  Has consult scheduled with therapist today.  Denies having any history of being treated with anti-depressant in the past.  Patietn is bottle feeding the baby.  Taking OCPs for contraception        Review of Systems   Constitutional:  Negative for activity change and unexpected weight change.   Psychiatric/Behavioral:  Positive for decreased concentration, dysphoric mood and sleep disturbance. Negative for agitation, behavioral problems, confusion, hallucinations, self-injury and suicidal ideas. The patient is not nervous/anxious and is not hyperactive.          Objective:     Physical Exam

## 2024-11-21 ENCOUNTER — ANNUAL EXAM (OUTPATIENT)
Dept: OBGYN CLINIC | Facility: CLINIC | Age: 29
End: 2024-11-21
Payer: COMMERCIAL

## 2024-11-21 VITALS
SYSTOLIC BLOOD PRESSURE: 102 MMHG | HEIGHT: 64 IN | WEIGHT: 150 LBS | DIASTOLIC BLOOD PRESSURE: 70 MMHG | BODY MASS INDEX: 25.61 KG/M2

## 2024-11-21 DIAGNOSIS — Z12.4 SCREENING FOR CERVICAL CANCER: ICD-10-CM

## 2024-11-21 DIAGNOSIS — Z30.011 ENCOUNTER FOR BCP (BIRTH CONTROL PILLS) INITIAL PRESCRIPTION: ICD-10-CM

## 2024-11-21 DIAGNOSIS — Z30.09 CONTRACEPTIVE EDUCATION: ICD-10-CM

## 2024-11-21 DIAGNOSIS — Z01.419 ENCOUNTER FOR GYNECOLOGICAL EXAMINATION WITHOUT ABNORMAL FINDING: Primary | ICD-10-CM

## 2024-11-21 PROCEDURE — 99395 PREV VISIT EST AGE 18-39: CPT | Performed by: OBSTETRICS & GYNECOLOGY

## 2024-11-21 RX ORDER — SERTRALINE HYDROCHLORIDE 25 MG/1
25 TABLET, FILM COATED ORAL DAILY
Qty: 90 TABLET | Refills: 3 | Status: SHIPPED | OUTPATIENT
Start: 2024-11-21 | End: 2025-06-19

## 2024-11-21 RX ORDER — NORETHINDRONE ACETATE AND ETHINYL ESTRADIOL 1MG-20(21)
1 KIT ORAL DAILY
Qty: 84 TABLET | Refills: 3 | Status: SHIPPED | OUTPATIENT
Start: 2024-11-21

## 2024-11-21 NOTE — PROGRESS NOTES
[facility] - [place]  [address], [address], [place] [address]    ASSESSMENT/PLAN: Gay Alan is a 29 y.o.  who presents for annual gynecologic exam.    Encounter for routine gynecologic examination  - Routine well woman exam completed today.  - HPV Vaccination status: Immunization series complete  - STI screening offered including HIV testing: Declined  - Contraceptive counseling discussed.  Current contraception: condoms or combination OCPs:     Additional problems addressed during this visit:  1. Encounter for gynecological examination without abnormal finding  2. Screening for cervical cancer  -     IGP, Aptima HPV, Rfx 16/18,45  3. Contraceptive education  4. Postpartum depression  -     sertraline (Zoloft) 25 mg tablet; Take 1 tablet (25 mg total) by mouth daily  5. Encounter for BCP (birth control pills) initial prescription  -     norethindrone-ethinyl estradiol (JUNEL FE 1/20) 1-20 MG-MCG per tablet; Take 1 tablet by mouth daily      CC:  Annual Gynecologic Examination    HPI: Gay Alan is a 29 y.o.  who presents for annual gynecologic examination.  28 yo  here for wellness exam.  Del in  2024  primary  C/S  for  FTP.   Bottle feeding  Onocp  w no missed pills.  Pap today.  + hx of  PP depression on Zoloft and doing well.  [place] 3       The following portions of the patient's history were reviewed and updated as appropriate: She  has a past medical history of Anxiety, Seasonal allergies, and Varicella.  She  has a past surgical history that includes [place] tooth extraction () and pr  delivery only (N/A, 2024).  Her family history includes Arrhythmia in her father; Graves' disease in her father; Heart disease in her father and paternal grandfather; Hyperlipidemia in her father; Hypertension in her mother; Miscarriages / Stillbirths in her mother; Thyroid disease in her maternal grandmother.  She  reports that she has never  "smoked. She has never been exposed to tobacco smoke. She has never used smokeless tobacco. She reports current alcohol use of about 1.0 standard drink of alcohol per week. She reports that she does not use drugs.  Current Outpatient Medications   Medication Sig Dispense Refill    norethindrone-ethinyl estradiol (JUNEL FE 1/20) 1-20 MG-MCG per tablet Take 1 tablet by mouth daily 84 tablet 3    Prenatal MV-Min-Fe Fum-FA-DHA (PRENATAL 1 PO) Take by mouth      sertraline (Zoloft) 25 mg tablet Take 1 tablet (25 mg total) by mouth daily 90 tablet 3     No current facility-administered medications for this visit.     She is allergic to amoxicillin and keflex [cephalexin]..    Review of Systems   Constitutional:  Negative for chills and fever.   HENT:  Negative for ear pain and sore throat.    Eyes:  Negative for pain and visual disturbance.   Respiratory:  Negative for cough and shortness of breath.    Cardiovascular:  Negative for chest pain and palpitations.   Gastrointestinal:  Negative for abdominal pain and vomiting.   Endocrine: Negative.    Genitourinary: Negative.  Negative for dysuria and hematuria.   Musculoskeletal:  Negative for arthralgias and back pain.   Skin:  Negative for color change and rash.   Allergic/Immunologic: Negative.    Neurological: Negative.  Negative for seizures and syncope.   Hematological: Negative.    Psychiatric/Behavioral: Negative.     All other systems reviewed and are negative.        Objective:  /70 (BP Location: Right arm, Patient Position: Sitting, Cuff Size: Standard)   Ht 5' 4\" (1.626 m)   Wt 68 kg (150 lb)   LMP 11/12/2024   BMI 25.75 kg/m²    Physical Exam  Vitals and nursing note reviewed.   Constitutional:       Appearance: Normal appearance.   HENT:      Head: Normocephalic.   Cardiovascular:      Rate and Rhythm: Normal rate and regular rhythm.      Pulses: Normal pulses.      Heart sounds: Normal heart sounds.   Pulmonary:      Effort: Pulmonary effort is " normal.      Breath sounds: Normal breath sounds.   Chest:      Chest wall: No mass, lacerations, swelling, tenderness or edema.   Breasts:     Juan Miguel Score is 4.      Breasts are symmetrical.      Right: Normal. No swelling, bleeding, inverted nipple, mass, nipple discharge, skin change or tenderness.      Left: No swelling, bleeding, inverted nipple, mass, nipple discharge, skin change or tenderness.   Abdominal:      General: Abdomen is flat. Bowel sounds are normal.      Palpations: Abdomen is soft.   Genitourinary:     General: Normal vulva.      Exam position: Lithotomy position.      Pubic Area: No rash.       Juan Miguel stage (genital): 4.      Labia:         Right: No rash, tenderness or lesion.         Left: No rash, tenderness or lesion.       Urethra: No urethral pain, urethral swelling or urethral lesion.      Vagina: Normal.      Cervix: No cervical motion tenderness or discharge.      Uterus: Normal.       Adnexa: Right adnexa normal and left adnexa normal.      Rectum: Normal.   Musculoskeletal:         General: Normal range of motion.      Cervical back: Neck supple.   Lymphadenopathy:      Upper Body:      Right upper body: No supraclavicular, axillary or pectoral adenopathy.      Left upper body: No supraclavicular, axillary or pectoral adenopathy.      Lower Body: No right inguinal adenopathy. No left inguinal adenopathy.   Skin:     General: Skin is warm and dry.   Neurological:      General: No focal deficit present.      Mental Status: She is alert and oriented to person, place, and time.   Psychiatric:         Mood and Affect: Mood normal.         Behavior: Behavior normal.         Thought Content: Thought content normal.         Judgment: Judgment normal.

## 2024-11-29 LAB
CYTOLOGIST CVX/VAG CYTO: NORMAL
DX ICD CODE: NORMAL
HPV GENOTYPE REFLEX: NORMAL
HPV I/H RISK 4 DNA CVX QL PROBE+SIG AMP: NEGATIVE
OTHER STN SPEC: NORMAL
PATH REPORT.FINAL DX SPEC: NORMAL
SL AMB NOTE:: NORMAL
SL AMB SPECIMEN ADEQUACY: NORMAL
SL AMB TEST METHODOLOGY: NORMAL

## 2024-12-01 ENCOUNTER — RESULTS FOLLOW-UP (OUTPATIENT)
Dept: OBGYN CLINIC | Facility: CLINIC | Age: 29
End: 2024-12-01

## 2025-01-29 DIAGNOSIS — Z30.011 ENCOUNTER FOR BCP (BIRTH CONTROL PILLS) INITIAL PRESCRIPTION: ICD-10-CM

## 2025-01-29 NOTE — TELEPHONE ENCOUNTER
NOT A DUPLICATE:  Patient confirmed scripts went to the wrong pharmacy. Requesting scripts sent to [facility].     Reason for call:   [x] Refill   [] Prior Auth  [] Other:     Office:   [] PCP/Provider -   [x] Specialty/Provider - OBGYN    Medication:   norethindrone-ethinyl estradiol (JUNEL FE 1/20) 1-20 MG-MCG/ Take 1 tablet by mouth daily     sertraline (Zoloft) 25 mg/Take 1 tablet (25 mg total) by mouth daily       Quantity:     Pharmacy:   [facility] [place] [facility] [address]       Does the patient have enough for 3 days?   [] Yes   [x] No - Send as HP to POD

## 2025-01-30 RX ORDER — SERTRALINE HYDROCHLORIDE 25 MG/1
25 TABLET, FILM COATED ORAL DAILY
Qty: 90 TABLET | Refills: 1 | Status: SHIPPED | OUTPATIENT
Start: 2025-01-30 | End: 2025-08-28

## 2025-01-30 RX ORDER — NORETHINDRONE ACETATE AND ETHINYL ESTRADIOL 1MG-20(21)
1 KIT ORAL DAILY
Qty: 84 TABLET | Refills: 1 | Status: SHIPPED | OUTPATIENT
Start: 2025-01-30

## 2025-07-25 DIAGNOSIS — Z30.011 ENCOUNTER FOR BCP (BIRTH CONTROL PILLS) INITIAL PRESCRIPTION: ICD-10-CM

## 2025-07-25 RX ORDER — NORETHINDRONE ACETATE AND ETHINYL ESTRADIOL 1MG-20(21)
1 KIT ORAL DAILY
Qty: 84 TABLET | Refills: 1 | Status: SHIPPED | OUTPATIENT
Start: 2025-07-25

## 2025-07-25 RX ORDER — SERTRALINE HYDROCHLORIDE 25 MG/1
25 TABLET, FILM COATED ORAL DAILY
Qty: 90 TABLET | Refills: 1 | Status: SHIPPED | OUTPATIENT
Start: 2025-07-25

## (undated) DEVICE — SKIN MARKER DUAL TIP WITH RULER CAP, FLEXIBLE RULER AND LABELS: Brand: DEVON

## (undated) DEVICE — Device

## (undated) DEVICE — SUT VICRYL 0 CT-1 27 IN J260H

## (undated) DEVICE — SUT PLAIN 2-0 CTX 27 IN 872H

## (undated) DEVICE — SUT VICRYL 2-0 SH 27 IN UNDYED J417H

## (undated) DEVICE — DRESSING MEPILEX AG BORDER 4 X 8 IN

## (undated) DEVICE — PACK C-SECTION PBDS

## (undated) DEVICE — CHLORAPREP HI-LITE 26ML ORANGE

## (undated) DEVICE — 3M™ STERI-STRIP™ REINFORCED ADHESIVE SKIN CLOSURES, R1547, 1/2 IN X 4 IN (12 MM X 100 MM), 6 STRIPS/ENVELOPE: Brand: 3M™ STERI-STRIP™

## (undated) DEVICE — GLOVE PI ULTRA TOUCH SZ.7.5

## (undated) DEVICE — TELFA NON-ADHERENT ABSORBENT DRESSING: Brand: TELFA

## (undated) DEVICE — DRESSING MEPILEX AG BORDER 4 X 12 IN

## (undated) DEVICE — GLOVE INDICATOR PI UNDERGLOVE SZ 7.5 BLUE

## (undated) DEVICE — MEDI-VAC YANKAUER SUCTION HANDLE W/STRAIGHT TIP & CONTROL VENT: Brand: CARDINAL HEALTH

## (undated) DEVICE — ABDOMINAL PAD: Brand: DERMACEA